# Patient Record
Sex: FEMALE | Race: WHITE | Employment: OTHER | URBAN - METROPOLITAN AREA
[De-identification: names, ages, dates, MRNs, and addresses within clinical notes are randomized per-mention and may not be internally consistent; named-entity substitution may affect disease eponyms.]

---

## 2022-09-15 ENCOUNTER — ANESTHESIA EVENT (OUTPATIENT)
Dept: ENDOSCOPY | Age: 77
End: 2022-09-15
Payer: MEDICARE

## 2022-09-16 ENCOUNTER — HOSPITAL ENCOUNTER (OUTPATIENT)
Age: 77
Setting detail: OUTPATIENT SURGERY
Discharge: HOME | End: 2022-09-16
Attending: INTERNAL MEDICINE | Admitting: INTERNAL MEDICINE
Payer: MEDICARE

## 2022-09-16 ENCOUNTER — ANESTHESIA (OUTPATIENT)
Dept: ENDOSCOPY | Age: 77
End: 2022-09-16
Payer: MEDICARE

## 2022-09-16 VITALS
RESPIRATION RATE: 20 BRPM | SYSTOLIC BLOOD PRESSURE: 136 MMHG | BODY MASS INDEX: 37.54 KG/M2 | HEART RATE: 83 BPM | OXYGEN SATURATION: 93 % | HEIGHT: 62 IN | DIASTOLIC BLOOD PRESSURE: 83 MMHG | WEIGHT: 204 LBS | TEMPERATURE: 96 F

## 2022-09-16 DIAGNOSIS — K21.9 GASTROESOPHAGEAL REFLUX DISEASE, UNSPECIFIED WHETHER ESOPHAGITIS PRESENT: ICD-10-CM

## 2022-09-16 PROCEDURE — 3700000001 HC ADD 15 MINUTES (ANESTHESIA): Performed by: INTERNAL MEDICINE

## 2022-09-16 PROCEDURE — 3609010600 HC COLONOSCOPY POLYPECTOMY SNARE/COLD BIOPSY: Performed by: INTERNAL MEDICINE

## 2022-09-16 PROCEDURE — 2580000003 HC RX 258: Performed by: FAMILY MEDICINE

## 2022-09-16 PROCEDURE — 7100000010 HC PHASE II RECOVERY - FIRST 15 MIN: Performed by: INTERNAL MEDICINE

## 2022-09-16 PROCEDURE — 7100000011 HC PHASE II RECOVERY - ADDTL 15 MIN: Performed by: INTERNAL MEDICINE

## 2022-09-16 PROCEDURE — 6360000002 HC RX W HCPCS

## 2022-09-16 PROCEDURE — 2720000010 HC SURG SUPPLY STERILE: Performed by: INTERNAL MEDICINE

## 2022-09-16 PROCEDURE — 3609013000 HC EGD TRANSORAL CONTROL BLEEDING ANY METHOD: Performed by: INTERNAL MEDICINE

## 2022-09-16 PROCEDURE — 2709999900 HC NON-CHARGEABLE SUPPLY: Performed by: INTERNAL MEDICINE

## 2022-09-16 PROCEDURE — 3609012400 HC EGD TRANSORAL BIOPSY SINGLE/MULTIPLE: Performed by: INTERNAL MEDICINE

## 2022-09-16 PROCEDURE — 3700000000 HC ANESTHESIA ATTENDED CARE: Performed by: INTERNAL MEDICINE

## 2022-09-16 PROCEDURE — 88305 TISSUE EXAM BY PATHOLOGIST: CPT

## 2022-09-16 PROCEDURE — 2500000003 HC RX 250 WO HCPCS

## 2022-09-16 RX ORDER — OLMESARTAN MEDOXOMIL 20 MG/1
20 TABLET ORAL DAILY
COMMUNITY
Start: 2022-06-15

## 2022-09-16 RX ORDER — CYCLOBENZAPRINE HCL 10 MG
TABLET ORAL
COMMUNITY
Start: 2018-01-15

## 2022-09-16 RX ORDER — HYDROCORTISONE 5 MG/1
TABLET ORAL
COMMUNITY
Start: 2019-03-21

## 2022-09-16 RX ORDER — LORATADINE 10 MG/1
TABLET ORAL
COMMUNITY
Start: 2022-01-03

## 2022-09-16 RX ORDER — NYSTATIN 100000 U/G
OINTMENT TOPICAL 3 TIMES DAILY
COMMUNITY
Start: 2022-04-29

## 2022-09-16 RX ORDER — LIDOCAINE HYDROCHLORIDE 20 MG/ML
INJECTION, SOLUTION EPIDURAL; INFILTRATION; INTRACAUDAL; PERINEURAL PRN
Status: DISCONTINUED | OUTPATIENT
Start: 2022-09-16 | End: 2022-09-16 | Stop reason: SDUPTHER

## 2022-09-16 RX ORDER — SODIUM CHLORIDE 9 MG/ML
INJECTION, SOLUTION INTRAVENOUS PRN
Status: DISCONTINUED | OUTPATIENT
Start: 2022-09-16 | End: 2022-09-16 | Stop reason: HOSPADM

## 2022-09-16 RX ORDER — SODIUM CHLORIDE 0.9 % (FLUSH) 0.9 %
5-40 SYRINGE (ML) INJECTION PRN
Status: DISCONTINUED | OUTPATIENT
Start: 2022-09-16 | End: 2022-09-16 | Stop reason: HOSPADM

## 2022-09-16 RX ORDER — LORAZEPAM 1 MG/1
1 TABLET ORAL NIGHTLY PRN
COMMUNITY
Start: 2018-04-24 | End: 2022-11-17

## 2022-09-16 RX ORDER — PROPOFOL 10 MG/ML
INJECTION, EMULSION INTRAVENOUS PRN
Status: DISCONTINUED | OUTPATIENT
Start: 2022-09-16 | End: 2022-09-16 | Stop reason: SDUPTHER

## 2022-09-16 RX ORDER — ERGOCALCIFEROL (VITAMIN D2) 1250 MCG
50000 CAPSULE ORAL
COMMUNITY

## 2022-09-16 RX ORDER — PROPOFOL 10 MG/ML
INJECTION, EMULSION INTRAVENOUS CONTINUOUS PRN
Status: DISCONTINUED | OUTPATIENT
Start: 2022-09-16 | End: 2022-09-16 | Stop reason: SDUPTHER

## 2022-09-16 RX ORDER — DULOXETIN HYDROCHLORIDE 30 MG/1
CAPSULE, DELAYED RELEASE ORAL
COMMUNITY
Start: 2022-09-05

## 2022-09-16 RX ORDER — ALBUTEROL SULFATE 90 UG/1
AEROSOL, METERED RESPIRATORY (INHALATION)
COMMUNITY
Start: 2015-11-05

## 2022-09-16 RX ORDER — DICYCLOMINE HYDROCHLORIDE 10 MG/1
CAPSULE ORAL
COMMUNITY
Start: 2022-08-24

## 2022-09-16 RX ORDER — ACETAMINOPHEN 500 MG
500 TABLET ORAL EVERY 6 HOURS PRN
COMMUNITY

## 2022-09-16 RX ORDER — DULOXETIN HYDROCHLORIDE 30 MG/1
CAPSULE, DELAYED RELEASE ORAL
COMMUNITY
Start: 2022-08-03

## 2022-09-16 RX ORDER — VALACYCLOVIR HYDROCHLORIDE 500 MG/1
500 TABLET, FILM COATED ORAL DAILY PRN
COMMUNITY
Start: 2020-04-15

## 2022-09-16 RX ORDER — AZELASTINE 1 MG/ML
1 SPRAY, METERED NASAL 2 TIMES DAILY
COMMUNITY

## 2022-09-16 RX ORDER — IRON,CARBONYL/ASCORBIC ACID 65MG-125MG
1 TABLET, DELAYED RELEASE (ENTERIC COATED) ORAL
COMMUNITY
Start: 2022-06-29

## 2022-09-16 RX ORDER — SODIUM CHLORIDE 0.9 % (FLUSH) 0.9 %
5-40 SYRINGE (ML) INJECTION EVERY 12 HOURS SCHEDULED
Status: DISCONTINUED | OUTPATIENT
Start: 2022-09-16 | End: 2022-09-16 | Stop reason: HOSPADM

## 2022-09-16 RX ORDER — SODIUM CHLORIDE, SODIUM LACTATE, POTASSIUM CHLORIDE, CALCIUM CHLORIDE 600; 310; 30; 20 MG/100ML; MG/100ML; MG/100ML; MG/100ML
INJECTION, SOLUTION INTRAVENOUS CONTINUOUS
Status: DISCONTINUED | OUTPATIENT
Start: 2022-09-16 | End: 2022-09-16 | Stop reason: HOSPADM

## 2022-09-16 RX ORDER — GABAPENTIN 400 MG/1
CAPSULE ORAL
COMMUNITY
Start: 2021-01-22

## 2022-09-16 RX ADMIN — PROPOFOL 100 MCG/KG/MIN: 10 INJECTION, EMULSION INTRAVENOUS at 11:02

## 2022-09-16 RX ADMIN — PROPOFOL 30 MG: 10 INJECTION, EMULSION INTRAVENOUS at 11:02

## 2022-09-16 RX ADMIN — LIDOCAINE HYDROCHLORIDE 100 MG: 20 INJECTION, SOLUTION EPIDURAL; INFILTRATION; INTRACAUDAL; PERINEURAL at 11:00

## 2022-09-16 RX ADMIN — SODIUM CHLORIDE, POTASSIUM CHLORIDE, SODIUM LACTATE AND CALCIUM CHLORIDE: 600; 310; 30; 20 INJECTION, SOLUTION INTRAVENOUS at 10:25

## 2022-09-16 ASSESSMENT — PAIN SCALES - GENERAL
PAINLEVEL_OUTOF10: 0
PAINLEVEL_OUTOF10: 0

## 2022-09-16 ASSESSMENT — PAIN - FUNCTIONAL ASSESSMENT: PAIN_FUNCTIONAL_ASSESSMENT: 0-10

## 2022-09-16 NOTE — DISCHARGE INSTRUCTIONS
ENDOSCOPY DISCHARGE INSTRUCTIONS:    Call the physician that did your procedure for any questions or concern:    [facility]: [phone]  DR. LUCERO ALCALA       ACTIVITY:    There are potential side effects to the medications used for sedation and anesthesia during your procedure. These include:  Dizziness or light-headedness, confusion or memory loss, delayed reaction times, loss of coordination, nausea and vomiting. Because of your increased risk for injury, we ask that you observe the following precautions: For the next 24 hours,  DO NOT operate an automobile, bicycle, motorcycle, , power tools or large equipment of any kind. Do not drink alcohol, sign any legal documents or make any legal decisions for 24 hours. Do not bend your head over lower than your heart. DO sit on the side of bed/couch awhile before getting up. Plan on bedrest or quiet relaxation today. You may resume normal activities in 24 hours. DIET:    Your first meal today should be light, avoiding spicy and fatty foods. If you tolerate this first meal, then you may advance to your regular diet unless otherwise advised by your physician. NORMAL SYMPTOMS:  -Mild sore throat if youve had an EGD   -Gaseous discomfort    NOTIFY YOUR PHYSICIAN IF THESE SYMPTOMS OCCUR:  1. Fever (greater than 100)  5. Increased abdominal bloating  2. Severe pain    6. Excessive bleeding  3. Nausea and vomiting  7. Chest pain                                                                    4. Chills    8. Shortness of breath    ADDITIONAL INSTRUCTIONS:    Biopsy results: Call [address],[facility] for biopsy results in 1 week    Educational Information:  YOU  HAD 16 POLYPS-  FOLLOW UP AND REPEAT IN 1 YEAR     Colon Polyps: Care Instructions  Your Care Instructions     Colon polyps are growths in the colon or the rectum. The cause of most colon polyps is not known, and most people who get them do not have any problems.  But a certain kind can turn into cancer. For this reason, regular testing for colon polyps is important for people as they get older. It is also important for anyone who has an increased risk for colon cancer. Polyps are usually found through routine colon cancer screening tests. Although most colon polyps are not cancerous, they are usually removed and then tested for cancer. Screening for colon cancer saves lives because the cancer can usually be cured if it is caught early. If you have a polyp that is the type that can turn into cancer, you may need more tests to examine your entire colon. The doctor will remove any other polyps that are found, and you will be tested more often. Follow-up care is a key part of your treatment and safety. Be sure to make and go to all appointments, and call your doctor if you are having problems. It's also a good idea to know your test results and keep a list of the medicines you take. How can you care for yourself at home? Regular exams to look for colon polyps are the best way to prevent polyps from turning into colon cancer. These can include stool tests, sigmoidoscopy, colonoscopy, and CT colonography. Talk with your doctor about a testing schedule that is right for you. To prevent polyps  There is no home treatment that can prevent colon polyps. But these steps may help lower your risk for cancer. Stay active. Being active can help you get to and stay at a healthy weight. Try to exercise on most days of the week. Walking is a good choice. Eat well. Choose a variety of vegetables, fruits, legumes (such as peas and beans), fish, poultry, and whole grains. Do not smoke. If you need help quitting, talk to your doctor about stop-smoking programs and medicines. These can increase your chances of quitting for good. If you drink alcohol, limit how much you drink. Limit alcohol to 2 drinks a day for men and 1 drink a day for women. When should you call for help?    Call your doctor now or seek immediate medical care if:    You have severe belly pain. Your stools are maroon or very bloody. Watch closely for changes in your health, and be sure to contact your doctor if:    You have a fever. You have nausea or vomiting. You have a change in bowel habits (new constipation or diarrhea). Your symptoms get worse or are not improving as expected. Where can you learn more? Go to [email or web address] org and sign in to your [employer] account. Enter [phone] [id number] in the [employer] box to learn more about \"Colon Polyps: Care Instructions. \"     If you do not have an account, please click on the \"Sign Up Now\" link. Current as of: June 6, 2022               Content Version: 13.4  © 7208-2418 Healthwise, Incorporated. Care instructions adapted under license by [facility] ([facility]). If you have questions about a medical condition or this instruction, always ask your healthcare professional. [address] any warranty or liability for your use of this information. Please review these discharge instructions this evening or tomorrow for  information you may have forgotten. We want to thank you for choosing the [facility] as your health care provider. We always strive to provide you with excellent care while you are here. You may receive a survey in the mail regarding your care. We would appreciate you taking a few minutes of your time to complete this survey.

## 2022-09-16 NOTE — PROGRESS NOTES
Discharge instructions reviewed with patient/responsible adult and understanding verbalized. Discharge instructions signed and copies given. Patient discharged home with belongings. Pt left per w/c to go home .  SOB w exertion, states it is the same as usual.

## 2022-09-16 NOTE — PROCEDURES
Colonoscopy Procedure  Note          Patient: Woody Delarosa  : 1945  CRN:  @[id number]@    Procedure: Colonoscopy with biopsy, polypectomy (cold snare)    Date:  2022    Surgeon:  Mare Hoffman MD, MD    Referring Physician:  None Provider    Preoperative Diagnosis:  Gastroesophageal reflux disease, unspecified whether esophagitis present [K21.9]    Postoperative Diagnosis:  * No post-op diagnosis entered *    Anesthesia:  MAC    EBL: Minimal to none. Indications: This is a 78y.o. year old female who presents came to the office recently and she been having problems with a change in bowel habits were doing a colonoscopy    Procedure: An informed consent was obtained from the patient after explanation of indications, benefits, possible risks and complications of the procedure. The patient was then taken to the endoscopy suite, placed in the left lateral decubitus position, and the above IV anesthesia was administered. Digital rectal examination was performed. No masses good rectal tone      Rectum there were multiple small polyps we took 7 with jumbo biopsy forceps    Sigmoid diverticulosis and we also removed 2 polyps here with jumbo biopsy forceps    Descending we removed 4 polyps here she also had diverticulosis to those polyps we did remove with cold snare polypectomy and 1 of those we needed to place an Endo Clip on    Transverse there were no polyps here    Ascending we removed 3 polyps here in all 3 of these with jumbo biopsy forceps    Cecum this was normal    TI not intubated    Prep was excellent      The patient tolerated the procedure well and was taken to the PACU in good condition. There were no immediate complications.       Impression: 16 total colon polyps removed  Diverticulosis    Recommendations: Await biopsy results  Repeat colonoscopy in 1 year due to the number of polyps that we removed today  Thank you for this kind referral      Mare Hoffman MD, MD [facility]  9/16/2022      Please note that some or all of this record was generated using voice recognition software. If there are any questions about the content of this document, please contact the author as some errors in translation may have occurred.

## 2022-09-16 NOTE — H&P
Gastroenterology Note             Pre-operative History and Physical    Patient: Juany Gtz  : 1945  CSN:     History Obtained From:  patient and/or guardian. HISTORY OF PRESENT ILLNESS:    The patient is a 78 y.o. female  here for EGD/colonoscopy. This is a patient who I had the pleasure of seeing in the office she has been having problems with the loss of appetite and change in her bowel habits are doing upper and lower endoscopy    Past Medical History:    Past Medical History:   Diagnosis Date    Adrenal insufficiency (HCC)     CKD (chronic kidney disease)     COPD (chronic obstructive pulmonary disease) (HCC)     Depression     GERD (gastroesophageal reflux disease)     Hyperlipidemia     Hypertension     Sleep apnea     CPAP at night     Past Surgical History:    Past Surgical History:   Procedure Laterality Date    BACK SURGERY      CARPAL TUNNEL RELEASE Bilateral     Twice    JOINT REPLACEMENT Right     Hip    JOINT REPLACEMENT Left     Knee    KNEE ARTHROSCOPY Right     LAMINECTOMY      SPINAL FUSION       Medications Prior to Admission:   No current facility-administered medications on file prior to encounter.      Current Outpatient Medications on File Prior to Encounter   Medication Sig Dispense Refill    albuterol sulfate HFA (PROVENTIL;VENTOLIN;PROAIR) 108 (90 Base) MCG/ACT inhaler INHALE TWO PUFFS BY MOUTH into THE lungs EVERY 6 HOURS AS NEEDED wheezing      cyclobenzaprine (FLEXERIL) 10 MG tablet TAKE 1 OR 2 TABLETS BY MOUTH AT BEDTIME AS NEEDED muscle SPASMS      diclofenac sodium (VOLTAREN) 1 % GEL Apply 4 g topically 4 times daily      DULoxetine (CYMBALTA) 30 MG extended release capsule TAKE ONE CAPSULE BY MOUTH EVERY EVENING      fluticasone-umeclidin-vilant (TRELEGY ELLIPTA) 100-62.5-25 MCG/INH AEPB Inhale 1 puff into the lungs daily      gabapentin (NEURONTIN) 400 MG capsule TAKE 2 CAPSULES EVERY AFTERNOON AND 2 CAPSULES EVERY EVENING      hydrocortisone (CORTEF) 5 MG tablet Take 3 tablets by mouth every morning and take 1 tablet every afternoon. Take triple dose when sick and inform doctor of illness. hydrocortisone sodium succinate PF (SOLU-CORTEF) 100 MG injection jennifer 100mg intramuscular inj in emergency when oral hydrocortisone cannot be taken, inform MD      ipratropium (ATROVENT) 0.02 % nebulizer solution USE 1 VIAL VIA NEBULIZER EVERY 6 HOURS AS NEEDED FOR SHORTNESS OF BREATH. Iron-Vitamin C (VITRON-C)  MG TABS Take 1 tablet by mouth Every Monday, Wednesday, and Friday      loratadine (CLARITIN) 10 MG tablet TAKE ONE TABLET BY MOUTH every night AT BEDTIME      LORazepam (ATIVAN) 1 MG tablet Take 1 mg by mouth nightly as needed.       nystatin (MYCOSTATIN) 923514 UNIT/GM ointment Apply topically 3 times daily      olmesartan (BENICAR) 20 MG tablet Take 20 mg by mouth daily      valACYclovir (VALTREX) 500 MG tablet Take 500 mg by mouth daily as needed (Patient not taking: Reported on 2022)      acetaminophen (TYLENOL) 500 MG tablet Take 500 mg by mouth every 6 hours as needed      azelastine (ASTELIN) 0.1 % nasal spray 1 spray by Nasal route 2 times daily      DULoxetine (CYMBALTA) 30 MG extended release capsule TAKE ONE CAPSULE BY MOUTH EVERY EVENING      ergocalciferol (ERGOCALCIFEROL) 1.25 MG (67999 UT) capsule Take 50,000 Units by mouth      fluticasone (VERAMYST) 27.5 MCG/SPRAY nasal spray 2 sprays by Nasal route daily      dicyclomine (BENTYL) 10 MG capsule TAKE ONE CAPSULE BY MOUTH EVERY DAY          Allergies:  Codeine, Hydrocodone-acetaminophen, Oxycodone-acetaminophen, Penicillins, Tramadol, Morphine, Fentanyl, and Sulfa antibiotics      Social History:   Social History     Tobacco Use    Smoking status: Former     Packs/day: 0.25     Years: 40.00     Pack years: 10.00     Types: Cigarettes     Quit date: 2021     Years since quittin.4    Smokeless tobacco: Never   Substance Use Topics    Alcohol use: Yes     Comment: occasionaly Family History:   History reviewed. No pertinent family history. PHYSICAL EXAM:      BP (!) 152/78   Pulse 99   Temp 97.6 °F (36.4 °C) (Temporal)   Resp 18   Ht 5' 2\" (1.575 m)   Wt 204 lb (92.5 kg)   SpO2 96%   BMI 37.31 kg/m²  I        Heart:   RRR, normal s1s2    Lungs:  CTA bilat,  Normal effort    Abdomen:   NT, ND      ASA Grade:  ASA 3 - Patient with moderate systemic disease with functional limitations    Mallampati Class: 3          ASSESSMENT AND PLAN:    1. Patient is a 78 y.o. female here for EGD/Colonoscopy with MAC.   2.  Procedure options, risks and benefits reviewed with patient. Patient expresses understanding.     Adrián Sierra MD,   [address]  9/16/2022

## 2022-09-16 NOTE — ANESTHESIA PRE PROCEDURE
Department of Anesthesiology  Preprocedure Note       Name:  Ailyn Alexandra   Age:  78 y.o.  :  1945                                          MRN:  [id number]         Date:  2022      Surgeon: Jimmie Gupta):  Vilma Cao MD    Procedure: Procedure(s):  ESOPHAGOGASTRODUODENOSCOPY  COLONOSCOPY    Medications prior to admission:   Prior to Admission medications    Medication Sig Start Date End Date Taking? Authorizing Provider   albuterol sulfate HFA (PROVENTIL;VENTOLIN;PROAIR) 108 (90 Base) MCG/ACT inhaler INHALE TWO PUFFS BY MOUTH into THE lungs EVERY 6 HOURS AS NEEDED wheezing 11/5/15  Yes Historical Provider, MD   cyclobenzaprine (FLEXERIL) 10 MG tablet TAKE 1 OR 2 TABLETS BY MOUTH AT BEDTIME AS NEEDED muscle SPASMS 1/15/18  Yes Historical Provider, MD   diclofenac sodium (VOLTAREN) 1 % GEL Apply 4 g topically 4 times daily 17  Yes Historical Provider, MD   DULoxetine (CYMBALTA) 30 MG extended release capsule TAKE ONE CAPSULE BY MOUTH EVERY EVENING 8/3/22  Yes Historical Provider, MD   fluticasone-umeclidin-vilant (TRELEGY ELLIPTA) 100-62.5-25 MCG/INH AEPB Inhale 1 puff into the lungs daily 21  Yes Historical Provider, MD   gabapentin (NEURONTIN) 400 MG capsule TAKE 2 CAPSULES EVERY AFTERNOON AND 2 CAPSULES EVERY EVENING 21  Yes Historical Provider, MD   hydrocortisone (CORTEF) 5 MG tablet Take 3 tablets by mouth every morning and take 1 tablet every afternoon. Take triple dose when sick and inform doctor of illness.  3/21/19  Yes Historical Provider, MD   hydrocortisone sodium succinate PF (SOLU-CORTEF) 100 MG injection jennifer 100mg intramuscular inj in emergency when oral hydrocortisone cannot be taken, inform MD 20  Yes Historical Provider, MD   ipratropium (ATROVENT) 0.02 % nebulizer solution USE 1 VIAL VIA NEBULIZER EVERY 6 HOURS AS NEEDED FOR SHORTNESS OF BREATH. 20  Yes Historical Provider, MD   Iron-Vitamin C (VITRON-C)  MG TABS Take 1 tablet by mouth Every Monday, Wednesday, and Friday 6/29/22  Yes Historical Provider, MD   loratadine (CLARITIN) 10 MG tablet TAKE ONE TABLET BY MOUTH every night AT BEDTIME 1/3/22  Yes Historical Provider, MD   LORazepam (ATIVAN) 1 MG tablet Take 1 mg by mouth nightly as needed. 4/24/18 11/17/22 Yes Historical Provider, MD   nystatin (MYCOSTATIN) 918477 UNIT/GM ointment Apply topically 3 times daily 4/29/22  Yes Historical Provider, MD   olmesartan (BENICAR) 20 MG tablet Take 20 mg by mouth daily 6/15/22  Yes Historical Provider, MD   valACYclovir (VALTREX) 500 MG tablet Take 500 mg by mouth daily as needed  Patient not taking: Reported on 9/16/2022 4/15/20  Yes Historical Provider, MD   acetaminophen (TYLENOL) 500 MG tablet Take 500 mg by mouth every 6 hours as needed    Historical Provider, MD   azelastine (ASTELIN) 0.1 % nasal spray 1 spray by Nasal route 2 times daily    Historical Provider, MD   DULoxetine (CYMBALTA) 30 MG extended release capsule TAKE ONE CAPSULE BY MOUTH EVERY EVENING 9/5/22   Historical Provider, MD   ergocalciferol (ERGOCALCIFEROL) 1.25 MG (80740 UT) capsule Take 50,000 Units by mouth    Historical Provider, MD   fluticasone (VERAMYST) 27.5 MCG/SPRAY nasal spray 2 sprays by Nasal route daily    Historical Provider, MD   dicyclomine (BENTYL) 10 MG capsule TAKE ONE CAPSULE BY MOUTH EVERY DAY 8/24/22   Historical Provider, MD       Current medications:    No current facility-administered medications for this encounter. Allergies: Allergies   Allergen Reactions    Codeine Anaphylaxis    Hydrocodone-Acetaminophen Nausea And Vomiting    Oxycodone-Acetaminophen Itching and Shortness Of Breath    Penicillins Anaphylaxis    Tramadol Itching and Shortness Of Breath    Morphine Nausea And Vomiting    Fentanyl Nausea And Vomiting    Sulfa Antibiotics Nausea Only       Problem List:  There is no problem list on file for this patient. Past Medical History:  No past medical history on file.     Past Surgical History:  No past surgical history on file. Social History:    Social History     Tobacco Use    Smoking status: Not on file    Smokeless tobacco: Not on file   Substance Use Topics    Alcohol use: Not on file                                Counseling given: Not Answered      Vital Signs (Current):   Vitals:    09/16/22 0919   BP: (!) 152/78   Pulse: 99   Resp: 18   Temp: 97.6 °F (36.4 °C)   TempSrc: Temporal   SpO2: 95%   Weight: 204 lb (92.5 kg)   Height: 5' 2\" (1.575 m)                                              BP Readings from Last 3 Encounters:   09/16/22 (!) 152/78       NPO Status:                                                                                 BMI:   Wt Readings from Last 3 Encounters:   09/16/22 204 lb (92.5 kg)     Body mass index is 37.31 kg/m². CBC: No results found for: WBC, RBC, HGB, HCT, MCV, RDW, PLT    CMP: No results found for: NA, K, CL, CO2, BUN, CREATININE, GFRAA, AGRATIO, LABGLOM, GLUCOSE, GLU, PROT, CALCIUM, BILITOT, ALKPHOS, AST, ALT    POC Tests: No results for input(s): POCGLU, POCNA, POCK, POCCL, POCBUN, POCHEMO, POCHCT in the last 72 hours.     Coags: No results found for: PROTIME, INR, APTT    HCG (If Applicable): No results found for: PREGTESTUR, PREGSERUM, HCG, HCGQUANT     ABGs: No results found for: PHART, PO2ART, VMZ9DGE, XOA1VLH, BEART, J5SULPXG     Type & Screen (If Applicable):  No results found for: LABABO, LABRH    Drug/Infectious Status (If Applicable):  No results found for: HIV, HEPCAB    COVID-19 Screening (If Applicable): No results found for: COVID19        Anesthesia Evaluation  Patient summary reviewed and Nursing notes reviewed no history of anesthetic complications:   Airway: Mallampati: III  TM distance: >3 FB   Neck ROM: full  Mouth opening: > = 3 FB   Dental: normal exam         Pulmonary:Negative Pulmonary ROS and normal exam                               Cardiovascular:  Exercise tolerance: good (>4 METS),       (-)  angina, orthopnea and PND    ECG reviewed  Rhythm: regular  Rate: normal           Beta Blocker:  Not on Beta Blocker         Neuro/Psych:   Negative Neuro/Psych ROS              GI/Hepatic/Renal: Neg GI/Hepatic/Renal ROS            Endo/Other: Negative Endo/Other ROS                    Abdominal:   (+) obese,     Abdomen: soft. Vascular: negative vascular ROS. Other Findings:           Anesthesia Plan      MAC     ASA 3       Induction: intravenous. Anesthetic plan and risks discussed with patient. Plan discussed with CRNA and attending.                     Lizeth Adams DO   9/16/2022

## 2022-09-16 NOTE — PROCEDURES
Endoscopy Note    Patient: Skip Ochoa  : 1945  CSN:     Procedure: Esophagogastroduodenoscopy with bx     Date:  2022     Surgeon:  Carla Hahn MD     Referring Physician:  none     Preoperative Diagnosis:  loss of appetite     Postoperative Diagnosis:  hiatal hernia   Gastritis     Anesthesia: Monitored anesthesia care    EBL: <5 mL    Indications: This is a 78y.o. year old female who recently came to the office because she has been having problems with loss of appetite redoing an upper endoscopy    Description of Procedure:  Informed consent was obtained from the patient after explanation of indications, benefits and possible risks and complications of the procedure. The patient was then taken to the endoscopy suite, placed in the left lateral decubitus position and the above IV sedation was administrered. The Olympus videoendoscope was passed through the hypopharynx   Posterior pharynx was normal    Esophagus was normal    Hiatal hernia 2 cm    Stomach had some inflammation we did biopsy for H. Pylori    Duodenum was normal    Retroflexion showed the hiatal hernia no other abnormalities      Gastric or Duodenal ulcer present: No      The patient tolerated the procedure well and was taken to the post anesthesia care unit in good condition. Impression: #1 hiatal hernia #2 gastritis      Recommendations:  At this time from the upper GI system it is hard to know why she is having a loss of appetite  Need to make sure she is on a proton pump inhibitor  We will also start checking into some imaging like a right upper quadrant ultrasound  Thank you for this kind referral    Carla Hahn MD, MD  [address] [facility]  [phone]

## 2022-09-20 NOTE — RESULT ENCOUNTER NOTE
Called and spoke with patient   Repeat colon in 1 year     I told her that she did not have bad reflux   And she needs to be on ppi daily

## 2024-04-12 RX ORDER — OMEPRAZOLE 40 MG/1
40 CAPSULE, DELAYED RELEASE ORAL DAILY
COMMUNITY
Start: 2023-12-14

## 2024-04-12 RX ORDER — MECLIZINE HCL 12.5 MG/1
12.5 TABLET ORAL AS NEEDED
COMMUNITY
Start: 2019-05-14

## 2024-04-12 RX ORDER — BUPROPION HYDROCHLORIDE 150 MG/1
150 TABLET ORAL DAILY
COMMUNITY
Start: 2019-02-11

## 2024-04-12 RX ORDER — DULOXETIN HYDROCHLORIDE 60 MG/1
60 CAPSULE, DELAYED RELEASE ORAL NIGHTLY
COMMUNITY
Start: 2018-07-12

## 2024-04-12 NOTE — PROGRESS NOTES
[facility] PRE-OPERATIVE INSTRUCTIONS       DOS: __4/17/24__        Pre-Op Instructions     [x]  A History and Physical will be required within 30 days prior to surgery date. Some patients may require cardiac or pulmonary clearance. H&P will be completed DOS for Endo/colonoscopy patients.     [x]  Reviewed Medical and Surgical history, medication list, confirmed with patient any implants, allergies, bleeding disorders, ALLY and reactions to Anesthesia.    [x]  If there is a change in physical condition between now and the day of surgery, please notify your surgeon. This includes a cough, cold, fever, sore throat, nausea, vomiting and diarrhea. Also notify your surgeon if you experience dizziness, shortness of breath or blurred vision.    [x] Reviewed hx of C-Diff, MRSA, VRE and/or recent use of Antibiotics     [x]  All patients having a procedure must have a ride home by a responsible person that is over the age of 18 and ensure it is someone that we can share medical information with. After discharge, a responsible adult needs to stay with you for 24 hours. There is a limit of 2 adult visitors per room.     If unable to secure ride and/or care taker, please contact surgeon's office.      [x]  No alcohol, smoking or marijuana use 24 hours prior to surgery. Any use of recreational drugs must be stopped 5 days prior to surgery.     [x]  NPO after midnight (Any heart, BP, seizure, thyroid and breathing medications are okay to take the morning of surgery with a small sip of water).    The morning of surgery, you may brush your teeth, just no swallowing water. Also, NO gum, candy, mints or ice chips.    [x]  For Colonoscopy's, follow prep-instructions as indicated by physician.     []  Patients with a insulin pump, keep set on basal rate on day of surgery. Long-acting insulin should be administered the evening before, take only half of usual dose. Always check with prescribing doctor if there are any

## 2024-04-16 ENCOUNTER — ANESTHESIA EVENT (OUTPATIENT)
Age: 79
End: 2024-04-16
Payer: MEDICARE

## 2024-04-17 ENCOUNTER — HOSPITAL ENCOUNTER (OUTPATIENT)
Age: 79
Setting detail: OUTPATIENT SURGERY
Discharge: HOME | End: 2024-04-17
Attending: INTERNAL MEDICINE | Admitting: INTERNAL MEDICINE
Payer: MEDICARE

## 2024-04-17 ENCOUNTER — ANESTHESIA (OUTPATIENT)
Age: 79
End: 2024-04-17
Payer: MEDICARE

## 2024-04-17 VITALS
HEIGHT: 62 IN | BODY MASS INDEX: 35.7 KG/M2 | SYSTOLIC BLOOD PRESSURE: 127 MMHG | TEMPERATURE: 97.6 F | OXYGEN SATURATION: 97 % | WEIGHT: 194 LBS | HEART RATE: 93 BPM | RESPIRATION RATE: 22 BRPM | DIASTOLIC BLOOD PRESSURE: 81 MMHG

## 2024-04-17 DIAGNOSIS — Z86.010 HISTORY OF COLON POLYPS: ICD-10-CM

## 2024-04-17 DIAGNOSIS — R13.14 DYSPHAGIA, PHARYNGOESOPHAGEAL PHASE: ICD-10-CM

## 2024-04-17 PROCEDURE — 6360000002 HC RX W HCPCS: Performed by: NURSE ANESTHETIST, CERTIFIED REGISTERED

## 2024-04-17 PROCEDURE — 7100000011 HC PHASE II RECOVERY - ADDTL 15 MIN: Performed by: INTERNAL MEDICINE

## 2024-04-17 PROCEDURE — 2709999900 HC NON-CHARGEABLE SUPPLY: Performed by: INTERNAL MEDICINE

## 2024-04-17 PROCEDURE — 3609010300 HC COLONOSCOPY W/BIOPSY SINGLE/MULTIPLE: Performed by: INTERNAL MEDICINE

## 2024-04-17 PROCEDURE — 2580000003 HC RX 258: Performed by: ANESTHESIOLOGY

## 2024-04-17 PROCEDURE — 3609012400 HC EGD TRANSORAL BIOPSY SINGLE/MULTIPLE: Performed by: INTERNAL MEDICINE

## 2024-04-17 PROCEDURE — 3700000001 HC ADD 15 MINUTES (ANESTHESIA): Performed by: INTERNAL MEDICINE

## 2024-04-17 PROCEDURE — 3700000000 HC ANESTHESIA ATTENDED CARE: Performed by: INTERNAL MEDICINE

## 2024-04-17 PROCEDURE — 3609017700 HC EGD DILATION GASTRIC/DUODENAL STRICTURE: Performed by: INTERNAL MEDICINE

## 2024-04-17 PROCEDURE — 88305 TISSUE EXAM BY PATHOLOGIST: CPT

## 2024-04-17 PROCEDURE — 94761 N-INVAS EAR/PLS OXIMETRY MLT: CPT

## 2024-04-17 PROCEDURE — 2580000003 HC RX 258: Performed by: NURSE ANESTHETIST, CERTIFIED REGISTERED

## 2024-04-17 PROCEDURE — 7100000010 HC PHASE II RECOVERY - FIRST 15 MIN: Performed by: INTERNAL MEDICINE

## 2024-04-17 PROCEDURE — 2700000000 HC OXYGEN THERAPY PER DAY

## 2024-04-17 RX ORDER — SODIUM CHLORIDE 9 MG/ML
INJECTION, SOLUTION INTRAVENOUS PRN
Status: DISCONTINUED | OUTPATIENT
Start: 2024-04-17 | End: 2024-04-17 | Stop reason: HOSPADM

## 2024-04-17 RX ORDER — SODIUM CHLORIDE 0.9 % (FLUSH) 0.9 %
5-40 SYRINGE (ML) INJECTION PRN
Status: DISCONTINUED | OUTPATIENT
Start: 2024-04-17 | End: 2024-04-17 | Stop reason: HOSPADM

## 2024-04-17 RX ORDER — SODIUM CHLORIDE 9 MG/ML
INJECTION, SOLUTION INTRAVENOUS CONTINUOUS PRN
Status: DISCONTINUED | OUTPATIENT
Start: 2024-04-17 | End: 2024-04-17 | Stop reason: SDUPTHER

## 2024-04-17 RX ORDER — SODIUM CHLORIDE 0.9 % (FLUSH) 0.9 %
5-40 SYRINGE (ML) INJECTION EVERY 12 HOURS SCHEDULED
Status: DISCONTINUED | OUTPATIENT
Start: 2024-04-17 | End: 2024-04-17 | Stop reason: HOSPADM

## 2024-04-17 RX ORDER — PROPOFOL 10 MG/ML
INJECTION, EMULSION INTRAVENOUS PRN
Status: DISCONTINUED | OUTPATIENT
Start: 2024-04-17 | End: 2024-04-17 | Stop reason: SDUPTHER

## 2024-04-17 RX ORDER — LIDOCAINE HYDROCHLORIDE 20 MG/ML
INJECTION, SOLUTION INTRAVENOUS PRN
Status: DISCONTINUED | OUTPATIENT
Start: 2024-04-17 | End: 2024-04-17 | Stop reason: SDUPTHER

## 2024-04-17 RX ORDER — DROPERIDOL 2.5 MG/ML
0.62 INJECTION, SOLUTION INTRAMUSCULAR; INTRAVENOUS
Status: DISCONTINUED | OUTPATIENT
Start: 2024-04-17 | End: 2024-04-17 | Stop reason: HOSPADM

## 2024-04-17 RX ORDER — ONDANSETRON 2 MG/ML
4 INJECTION INTRAMUSCULAR; INTRAVENOUS
Status: DISCONTINUED | OUTPATIENT
Start: 2024-04-17 | End: 2024-04-17 | Stop reason: HOSPADM

## 2024-04-17 RX ORDER — GLYCOPYRROLATE 0.2 MG/ML
INJECTION INTRAMUSCULAR; INTRAVENOUS PRN
Status: DISCONTINUED | OUTPATIENT
Start: 2024-04-17 | End: 2024-04-17 | Stop reason: SDUPTHER

## 2024-04-17 RX ORDER — NALOXONE HYDROCHLORIDE 0.4 MG/ML
INJECTION, SOLUTION INTRAMUSCULAR; INTRAVENOUS; SUBCUTANEOUS PRN
Status: DISCONTINUED | OUTPATIENT
Start: 2024-04-17 | End: 2024-04-17 | Stop reason: HOSPADM

## 2024-04-17 RX ADMIN — SODIUM CHLORIDE: 9 INJECTION, SOLUTION INTRAVENOUS at 10:12

## 2024-04-17 RX ADMIN — PROPOFOL 70 MG: 10 INJECTION, EMULSION INTRAVENOUS at 11:28

## 2024-04-17 RX ADMIN — GLYCOPYRROLATE 0.2 MG: 0.2 INJECTION, SOLUTION INTRAMUSCULAR; INTRAVENOUS at 12:10

## 2024-04-17 RX ADMIN — PROPOFOL 130 MCG/KG/MIN: 10 INJECTION, EMULSION INTRAVENOUS at 11:41

## 2024-04-17 RX ADMIN — LIDOCAINE HYDROCHLORIDE 60 MG: 20 INJECTION, SOLUTION INTRAVENOUS at 11:28

## 2024-04-17 RX ADMIN — SODIUM CHLORIDE: 9 INJECTION, SOLUTION INTRAVENOUS at 11:21

## 2024-04-17 RX ADMIN — PROPOFOL 50 MG: 10 INJECTION, EMULSION INTRAVENOUS at 11:32

## 2024-04-17 RX ADMIN — PROPOFOL 50 MG: 10 INJECTION, EMULSION INTRAVENOUS at 11:38

## 2024-04-17 ASSESSMENT — COPD QUESTIONNAIRES: CAT_SEVERITY: SEVERE

## 2024-04-17 ASSESSMENT — ENCOUNTER SYMPTOMS: SHORTNESS OF BREATH: 0

## 2024-04-17 NOTE — ANESTHESIA PRE PROCEDURE
Findings:             Anesthesia Plan      MAC     ASA 3     (Hx of tracheomalacia, severe COPD, ALLY on CPAP presents for colonoscopy and EGD.)  Induction: intravenous.      Anesthetic plan and risks discussed with patient.      Plan discussed with CRNA.                    Levi Faust MD   4/17/2024

## 2024-04-17 NOTE — NURSE NAVIGATOR
Pt arrived to preop room # 5 from endo. Report received from Endo RN and CRNA. Pt resting quietly in bed, respirations even and unlabored. Attached to Preop monitoring system. Alarms and parameters set. Will continue to monitor pt closely. Call light in reach. Son in law at bedside.

## 2024-04-17 NOTE — DISCHARGE INSTRUCTIONS
ENDOSCOPY DISCHARGE INSTRUCTIONS:    Call the physician that did your procedure for any questions or concern:    [facility]: [phone]  DR. LUCERO ALCALA       ACTIVITY:    There are potential side effects to the medications used for sedation and anesthesia during your procedure.  These include:  Dizziness or light-headedness, confusion or memory loss, delayed reaction times, loss of coordination, nausea and vomiting.  Because of your increased risk for injury, we ask that you observe the following precautions:  For the next 24 hours,  DO NOT operate an automobile, bicycle, motorcycle, , power tools or large equipment of any kind.  Do not drink alcohol, sign any legal documents or make any legal decisions for 24 hours.  Do not bend your head over lower than your heart.  DO sit on the side of bed/couch awhile before getting up.  Plan on bedrest or quiet relaxation today.  You may resume normal activities in 24 hours.    DIET:    Your first meal today should be light, avoiding spicy and fatty foods.  If you tolerate this first meal, then you may advance to your regular diet unless otherwise advised by your physician.    NORMAL SYMPTOMS:  -Mild sore throat if you’ve had an EGD   -Gaseous discomfort    NOTIFY YOUR PHYSICIAN IF THESE SYMPTOMS OCCUR:  1. Fever (greater than 100)  5. Increased abdominal bloating  2. Severe pain    6. Excessive bleeding  3. Nausea and vomiting  7. Chest pain                                                                    4. Chills    8. Shortness of breath    ADDITIONAL INSTRUCTIONS:    Biopsy results: Call [facility] for biopsy results in 1 week    Please review these discharge instructions this evening or tomorrow for  information you may have forgotten.          We want to thank you for choosing the [facility] as your health care provider. We always strive to provide you with excellent care while you are here. You may receive a survey in the mail  regarding your care. We would appreciate you taking a few minutes of your time to complete this survey.       Your I.V. Site: Care Instructions  Overview     Medicines or fluids may be given through an intravenous (I.V.) tube inserted into a vein. The I.V. is most often placed in the back of the hand, on the forearm, or on the inside of the elbow.  When the I.V. is in place, medicines or fluids can go quickly into the bloodstream and into the rest of the body. The I.V. can also be used to take blood for testing.  If you had an I.V. while you were in the hospital, the area where it went into your body may be tender for a while.  Follow-up care is a key part of your treatment and safety. Be sure to make and go to all appointments, and call your doctor if you are having problems. It's also a good idea to know your test results and keep a list of the medicines you take.  How can you care for yourself at home?  Check the area for bruising or swelling for a few days after you get home.  If you have bruising or swelling, put ice or a cold pack on the area for 10 to 20 minutes at a time. Put a thin cloth between the ice and your skin.  Shower or bathe as usual.  Be gentle using the area around the I.V. site for a day or two. But you should be able to do your normal activities.  When should you call for help?   Call your doctor now or seek immediate medical care if:    You have signs of infection, such as:  Increased pain, swelling, warmth, or redness or any change in color.  Reddish streaks leading from the area.  Pus draining from the area.  A fever.   Watch closely for changes in your health, and be sure to contact your doctor if:    You notice a lump at the I.V. site.     You notice new or worse bruising at the I.V. site.   Current as of: September 25, 2023               Content Version: 14.0  © 2006-2024 [employer].   Care instructions adapted under license by [employer]. If you have questions about a

## 2024-04-17 NOTE — ANESTHESIA POSTPROCEDURE EVALUATION
Department of Anesthesiology  Postprocedure Note    Patient: Tiffani Sher  MRN: [id number]  YOB: 1945  Date of evaluation: 4/17/2024    Procedure Summary       Date: 04/17/24 Room / Location: [place] [address] / [facility]    Anesthesia Start: 1122 Anesthesia Stop: 1212    Procedures:       COLONOSCOPY BIOPSY      ESOPHAGOGASTRODUODENOSCOPY BIOPSY      ESOPHAGOGASTRODUODENOSCOPY DILATATION Diagnosis:       History of colon polyps      Dysphagia, pharyngoesophageal phase      (History of colon polyps [Z86.010])      (Dysphagia, pharyngoesophageal phase [R13.14])    Surgeons: Nestor Fernandes MD Responsible Provider: Levi Faust MD    Anesthesia Type: MAC ASA Status: 3            Anesthesia Type: No value filed.    Michel Phase I: Michel Score: 10    Michel Phase II: Michel Score: 8    Anesthesia Post Evaluation    Patient location during evaluation: PACU  Patient participation: complete - patient participated  Level of consciousness: awake  Airway patency: patent  Nausea & Vomiting: no nausea and no vomiting  Cardiovascular status: blood pressure returned to baseline  Respiratory status: acceptable  Hydration status: stable  Comments: Vital signs stable  OK to discharge from Stage I post anesthesia care.  Care transferred from Anesthesiology department on discharge from perioperative area   Multimodal analgesia pain management approach  Pain management: satisfactory to patient    No notable events documented.

## 2024-04-17 NOTE — H&P
Gastroenterology Note             Pre-operative History and Physical    Patient: Tiffani Sher  : 1945  CSN:     History Obtained From:  patient and/or guardian.     HISTORY OF PRESENT ILLNESS:    The patient is a 79 y.o. female  here for EGD/colonoscopy.  This very pleasant 79-year-old female recently came to the office she had a history of colon polyps both an upper and lower and endoscopy    Past Medical History:    Past Medical History:   Diagnosis Date    Adrenal insufficiency (HCC)     CKD (chronic kidney disease)     COPD (chronic obstructive pulmonary disease) (HCC)     Depression     GERD (gastroesophageal reflux disease)     Hyperlipidemia     Hypertension     Sleep apnea     CPAP at night     Past Surgical History:    Past Surgical History:   Procedure Laterality Date    BACK SURGERY      BLADDER TUMOR EXCISION      CARPAL TUNNEL RELEASE Bilateral     Twice    COLONOSCOPY N/A 2022    COLONOSCOPY POLYPECTOMY SNARE/COLD BIOPSY performed by Nestor Fernandes MD at [facility] ENDOSCOPY    JOINT REPLACEMENT Right     Hip    JOINT REPLACEMENT Left     Knee    KNEE ARTHROSCOPY Right     LAMINECTOMY      SPINAL FUSION      UPPER GASTROINTESTINAL ENDOSCOPY N/A 2022    EGD BIOPSY performed by Nestor Fernandes MD at [facility] ENDOSCOPY    UPPER GASTROINTESTINAL ENDOSCOPY N/A 2022    EGD CONTROL HEMORRHAGE performed by Nestor Fernandes MD at [facility] ENDOSCOPY     Medications Prior to Admission:   No current facility-administered medications on file prior to encounter.     Current Outpatient Medications on File Prior to Encounter   Medication Sig Dispense Refill    buPROPion (WELLBUTRIN XL) 150 MG extended release tablet Take 1 tablet by mouth daily      meclizine (ANTIVERT) 12.5 MG tablet Take 1 tablet by mouth as needed      omeprazole (PRILOSEC) 40 MG delayed release capsule Take 1 capsule by mouth daily      Cyanocobalamin 1000 MCG/ML KIT Inject 1,000 mcg into the muscle every 30 days      DULoxetine  Tramadol, Morphine, Fentanyl, Other, and Sulfa antibiotics      Social History:   Social History     Tobacco Use    Smoking status: Former     Current packs/day: 0.00     Average packs/day: 0.3 packs/day for 40.0 years (10.0 ttl pk-yrs)     Types: Cigarettes     Start date: 4/1/1981     Quit date: 4/1/2021     Years since quitting: 3.0    Smokeless tobacco: Never   Substance Use Topics    Alcohol use: Yes     Comment: occasionaly     Family History:   History reviewed. No pertinent family history.    PHYSICAL EXAM:      /81   Pulse 96   Temp 98.1 °F (36.7 °C) (Infrared)   Resp 16   Ht 1.575 m (5' 2\")   Wt 88 kg (194 lb)   SpO2 97%   BMI 35.48 kg/m²  I        Heart:   RRR, normal s1s2    Lungs:  CTA bilat,  Normal effort    Abdomen:   NT, ND      ASA Grade:  ASA 3 - Patient with moderate systemic disease with functional limitations    Mallampati Class: 1          ASSESSMENT AND PLAN:    1.  Patient is a 79 y.o. female here for EGD/Colonoscopy with MAC.   2.  Procedure options, risks and benefits reviewed with patient.  Patient expresses understanding.    Nestor Fernandes MD,   [facility]  4/17/2024

## 2024-04-17 NOTE — PROGRESS NOTES
Pre-Operative:  1.  Patient/Caregiver identifies - states name and date of birth.  2.  The patient is free from signs and symptoms of injury.  3.  The patient receives appropriate medication(s), safely administered during the Perioperative period.  4.  The patient is free from signs and symptoms of infection.  5.  The patient has wound / tissue perfusion.  6.  The patients's fluid, electrolyte, and acid-base balances are established preoperatively.  7.  The patient's pulmonary function is established preoperatively.  8.  The patient's cardiovascular status is established preoperatively.  9.  The patient / caregiver demonstrates knowledge of nutritional management related to the operative or other invasive procedure.  10.  The patient/caregiver demonstrates knowledge of medication management.  11.  The patient/caregiver demonstrates knowledge of pain management.  12.  The patient participates in the rehabilitation process as applicable.  13.  The patient/caregiver participates in decisions affection his or her Perioperative plan of care.  14.  The patient's care is consistent with the individualized Perioperative plan of care.  15.  The patient's right to privacy is maintained.  16.  The patient is the recipient of competent and ethical care within legal standards of practice.  17.  The patient's value system, lifestyle, ethnicity, and culture are considered, respected, and incorporated in the Perioperative plan of care and understands special services available.  18.  The patient demonstrates and/or reports adequate pain control throughout the the Perioperative period.  19.  The patient's neurological status is established preoperatively.  20.  The patient/caregiver demonstrates knowledge of the expected responses to the operative or invasive procedure.  21.  Patient/Caregiver has reduced anxiety.  Interventions- Familiarize with environment and equipment.  22. Patient/Caregiver verbalizes understanding of Phase I  and Phase II process.  23.  Patient pain level is established preoperatively using age appropriate pain scale.  24.  The patient will move to fall risk upon sedation- during and through the recovery phase.  Interventions- orient the patient to the environment, especially the location of the bathroom; provide treaded socks/non-skid footwear; demonstrate and teach back use of the nurse's call system; instruct the patient to call for help before getting out of bed; lock all movable equipment before transferring patient; keep bed in lowest position possible. 25.  Other:

## 2024-04-19 LAB — SURGICAL PATHOLOGY REPORT: NORMAL

## 2025-04-18 RX ORDER — LOSARTAN POTASSIUM 25 MG/1
25 TABLET ORAL DAILY
COMMUNITY

## 2025-04-18 RX ORDER — CETIRIZINE HYDROCHLORIDE 10 MG/1
10 TABLET ORAL DAILY
COMMUNITY

## 2025-04-18 RX ORDER — ROSUVASTATIN CALCIUM 20 MG/1
20 TABLET, COATED ORAL NIGHTLY
COMMUNITY
Start: 2025-02-27

## 2025-04-18 RX ORDER — ASPIRIN 81 MG/1
81 TABLET ORAL DAILY
COMMUNITY
Start: 2025-02-27

## 2025-04-18 RX ORDER — NITROGLYCERIN 0.4 MG/1
TABLET SUBLINGUAL
COMMUNITY
Start: 2025-02-07

## 2025-04-18 RX ORDER — SOLIFENACIN SUCCINATE 5 MG/1
5 TABLET, FILM COATED ORAL DAILY
COMMUNITY
Start: 2025-03-17

## 2025-04-18 RX ORDER — POLYETHYLENE GLYCOL 3350 17 G/17G
17 POWDER, FOR SOLUTION ORAL DAILY
COMMUNITY
Start: 2024-08-07

## 2025-04-18 RX ORDER — ROFLUMILAST 500 UG/1
500 TABLET ORAL DAILY
COMMUNITY
Start: 2024-10-11

## 2025-04-18 RX ORDER — PHENOL 1.4 %
1 AEROSOL, SPRAY (ML) MUCOUS MEMBRANE DAILY
COMMUNITY

## 2025-04-18 NOTE — PROGRESS NOTES
[facility] PRE-OPERATIVE INSTRUCTIONS       DOS: __4/30/2025__        Pre-Op Instructions     Patients receiving local anesthetic only will arrive one hour prior to the procedure, all other patients will arrive 1.5 hours prior to procedure time.    [x]  A History and Physical will be required within 30 days prior to surgery date. Some patients may require cardiac or pulmonary clearance. H&P will be completed DOS for Endo/colonoscopy patients.     [x]  Reviewed Medical and Surgical history, medication list, confirmed with patient any implants, allergies, bleeding disorders, ALLY and reactions to Anesthesia.    [x]  If there is a change in physical condition between now and the day of surgery, please notify your surgeon. This includes a cough, cold, fever, sore throat, nausea, vomiting and diarrhea. Also notify your surgeon if you experience dizziness, shortness of breath or blurred vision.    [x] Reviewed hx of C-Diff, MRSA, VRE and/or recent use of Antibiotics     [x]  All patients having a procedure must have a ride home by a responsible person that is over the age of 18 and ensure it is someone that we can share medical information with. After discharge, a responsible adult needs to stay with you for 24 hours. There is a limit of 2 adult visitors per room.     If unable to secure ride and/or care taker, please contact surgeon's office.      [x]  No alcohol, smoking or marijuana use 24 hours prior to surgery. Any use of recreational drugs must be stopped 5 days prior to surgery.     [x]  NPO after midnight (Any heart, BP, seizure, thyroid and breathing medications are okay to take the morning of surgery with a small sip of water 4 hours prior to procedure).    The morning of surgery, you may brush your teeth, just no swallowing water. Also, NO gum, candy, mints or ice chips.    [x]  For Colonoscopy's, follow prep-instructions as indicated by physician.     []  Patients with a insulin pump, keep set

## 2025-04-29 ENCOUNTER — ANESTHESIA EVENT (OUTPATIENT)
Age: 80
End: 2025-04-29
Payer: MEDICARE

## 2025-04-30 ENCOUNTER — ANESTHESIA (OUTPATIENT)
Age: 80
End: 2025-04-30
Payer: MEDICARE

## 2025-04-30 ENCOUNTER — HOSPITAL ENCOUNTER (OUTPATIENT)
Age: 80
Setting detail: OUTPATIENT SURGERY
Discharge: HOME | End: 2025-04-30
Attending: INTERNAL MEDICINE | Admitting: INTERNAL MEDICINE
Payer: MEDICARE

## 2025-04-30 VITALS
WEIGHT: 180 LBS | TEMPERATURE: 97.6 F | HEIGHT: 62 IN | DIASTOLIC BLOOD PRESSURE: 85 MMHG | HEART RATE: 73 BPM | SYSTOLIC BLOOD PRESSURE: 164 MMHG | RESPIRATION RATE: 14 BRPM | OXYGEN SATURATION: 100 % | BODY MASS INDEX: 33.13 KG/M2

## 2025-04-30 DIAGNOSIS — Z86.0100 HISTORY OF COLON POLYPS: ICD-10-CM

## 2025-04-30 PROCEDURE — 6360000002 HC RX W HCPCS: Performed by: NURSE ANESTHETIST, CERTIFIED REGISTERED

## 2025-04-30 PROCEDURE — 88305 TISSUE EXAM BY PATHOLOGIST: CPT

## 2025-04-30 PROCEDURE — 3700000001 HC ADD 15 MINUTES (ANESTHESIA): Performed by: INTERNAL MEDICINE

## 2025-04-30 PROCEDURE — 2580000003 HC RX 258: Performed by: NURSE ANESTHETIST, CERTIFIED REGISTERED

## 2025-04-30 PROCEDURE — 3700000000 HC ANESTHESIA ATTENDED CARE: Performed by: INTERNAL MEDICINE

## 2025-04-30 PROCEDURE — 7100000011 HC PHASE II RECOVERY - ADDTL 15 MIN: Performed by: INTERNAL MEDICINE

## 2025-04-30 PROCEDURE — 2709999900 HC NON-CHARGEABLE SUPPLY: Performed by: INTERNAL MEDICINE

## 2025-04-30 PROCEDURE — 2720000010 HC SURG SUPPLY STERILE: Performed by: INTERNAL MEDICINE

## 2025-04-30 PROCEDURE — 7100000010 HC PHASE II RECOVERY - FIRST 15 MIN: Performed by: INTERNAL MEDICINE

## 2025-04-30 PROCEDURE — 3609010600 HC COLONOSCOPY POLYPECTOMY SNARE/COLD BIOPSY: Performed by: INTERNAL MEDICINE

## 2025-04-30 DEVICE — REPLAY HEMOSTASIS CLIP, 16MM SPAN
Type: IMPLANTABLE DEVICE | Site: ASCENDING COLON | Status: FUNCTIONAL
Brand: REPLAY

## 2025-04-30 RX ORDER — SODIUM CHLORIDE 9 MG/ML
INJECTION, SOLUTION INTRAVENOUS
Status: DISCONTINUED | OUTPATIENT
Start: 2025-04-30 | End: 2025-04-30 | Stop reason: SDUPTHER

## 2025-04-30 RX ORDER — NALOXONE HYDROCHLORIDE 0.4 MG/ML
INJECTION, SOLUTION INTRAMUSCULAR; INTRAVENOUS; SUBCUTANEOUS PRN
Status: DISCONTINUED | OUTPATIENT
Start: 2025-04-30 | End: 2025-04-30 | Stop reason: HOSPADM

## 2025-04-30 RX ORDER — LIDOCAINE HYDROCHLORIDE 20 MG/ML
INJECTION, SOLUTION EPIDURAL; INFILTRATION; INTRACAUDAL; PERINEURAL
Status: DISCONTINUED | OUTPATIENT
Start: 2025-04-30 | End: 2025-04-30 | Stop reason: SDUPTHER

## 2025-04-30 RX ORDER — SODIUM CHLORIDE 0.9 % (FLUSH) 0.9 %
5-40 SYRINGE (ML) INJECTION EVERY 12 HOURS SCHEDULED
Status: DISCONTINUED | OUTPATIENT
Start: 2025-04-30 | End: 2025-04-30 | Stop reason: HOSPADM

## 2025-04-30 RX ORDER — PROPOFOL 10 MG/ML
INJECTION, EMULSION INTRAVENOUS
Status: DISCONTINUED | OUTPATIENT
Start: 2025-04-30 | End: 2025-04-30 | Stop reason: SDUPTHER

## 2025-04-30 RX ORDER — SODIUM CHLORIDE 9 MG/ML
INJECTION, SOLUTION INTRAVENOUS PRN
Status: DISCONTINUED | OUTPATIENT
Start: 2025-04-30 | End: 2025-04-30 | Stop reason: HOSPADM

## 2025-04-30 RX ORDER — SODIUM CHLORIDE 0.9 % (FLUSH) 0.9 %
5-40 SYRINGE (ML) INJECTION PRN
Status: DISCONTINUED | OUTPATIENT
Start: 2025-04-30 | End: 2025-04-30 | Stop reason: HOSPADM

## 2025-04-30 RX ADMIN — PROPOFOL 60 MG: 10 INJECTION, EMULSION INTRAVENOUS at 11:48

## 2025-04-30 RX ADMIN — LIDOCAINE HYDROCHLORIDE 100 MG: 20 INJECTION, SOLUTION EPIDURAL; INFILTRATION; INTRACAUDAL; PERINEURAL at 11:48

## 2025-04-30 RX ADMIN — SODIUM CHLORIDE: 9 INJECTION, SOLUTION INTRAVENOUS at 11:39

## 2025-04-30 RX ADMIN — PROPOFOL 160 MCG/KG/MIN: 10 INJECTION, EMULSION INTRAVENOUS at 11:49

## 2025-04-30 ASSESSMENT — PAIN - FUNCTIONAL ASSESSMENT: PAIN_FUNCTIONAL_ASSESSMENT: 0-10

## 2025-04-30 ASSESSMENT — COPD QUESTIONNAIRES: CAT_SEVERITY: SEVERE

## 2025-04-30 ASSESSMENT — ENCOUNTER SYMPTOMS: SHORTNESS OF BREATH: 0

## 2025-04-30 NOTE — DISCHARGE INSTRUCTIONS

## 2025-04-30 NOTE — PROGRESS NOTES
Ambulatory Surgery/Procedure Discharge Note    Vitals:    04/30/25 1310   BP: (!) 164/85   Pulse: 73   Resp: 14   Temp:    SpO2: 100%       Patient discharged to home/self care. Patient discharged via wheel chair by transporter to waiting family/S.O.       4/30/2025 [id number]

## 2025-04-30 NOTE — ANESTHESIA POSTPROCEDURE EVALUATION
Department of Anesthesiology  Postprocedure Note    Patient: Tiffani Sher  MRN: [id number]  YOB: 1945  Date of evaluation: 4/30/2025    Procedure Summary       Date: 04/30/25 Room / Location: [address]    Anesthesia Start: 1140 Anesthesia Stop: 1234    Procedure: COLONOSCOPY POLYPECTOMY SNARE/BIOPSY Diagnosis:       History of colon polyps      (History of colon polyps [Z86.0100])    Surgeons: Nestor Fernandes MD Responsible Provider: Shiva Beltran MD    Anesthesia Type: MAC ASA Status: 3            Anesthesia Type: No value filed.    Michel Phase I: Michel Score: 10    Michel Phase II: Michel Score: 10    Anesthesia Post Evaluation    Patient location during evaluation: PACU  Patient participation: complete - patient participated  Level of consciousness: awake and alert  Pain score: 0  Airway patency: patent  Nausea & Vomiting: no nausea and no vomiting  Cardiovascular status: blood pressure returned to baseline  Respiratory status: acceptable  Hydration status: euvolemic  Pain management: adequate    No notable events documented.

## 2025-04-30 NOTE — H&P
Gastroenterology Note             Pre-operative History and Physical    Patient: Tiffani Sher  : 1945  CSN:     History Obtained From:  patient and/or guardian.     HISTORY OF PRESENT ILLNESS:    The patient is a 80 y.o. female  here for /colonoscopy.     Is a very pleasant 80-year-old female comes in today she had a colonoscopy previously by myself but her prep was what we would like and we could not really tell if she had colon polyps or not so today she is repeating her colonoscopy    Past Medical History:    Past Medical History:   Diagnosis Date    Adrenal insufficiency     CKD (chronic kidney disease)     COPD (chronic obstructive pulmonary disease) (HCC)     Depression     GERD (gastroesophageal reflux disease)     Hyperlipidemia     Hypertension     Sleep apnea     CPAP at night    Tracheomalacia      Past Surgical History:    Past Surgical History:   Procedure Laterality Date    BACK SURGERY      BLADDER TUMOR EXCISION      CARPAL TUNNEL RELEASE Bilateral     Twice    COLONOSCOPY N/A 2022    COLONOSCOPY POLYPECTOMY SNARE/COLD BIOPSY performed by Nestor Fernandes MD at [facility] ENDOSCOPY    COLONOSCOPY N/A 2024    COLONOSCOPY BIOPSY performed by Nestor Fernandes MD at [facility] ENDOSCOPY    JOINT REPLACEMENT Right     Hip    JOINT REPLACEMENT Left     Knee    KNEE ARTHROSCOPY Right     LAMINECTOMY      SPINAL CORD STIMULATOR SURGERY  2024    SPINAL FUSION      cervical    UPPER GASTROINTESTINAL ENDOSCOPY N/A 2022    EGD BIOPSY performed by Nestor Fernandes MD at [facility] ENDOSCOPY    UPPER GASTROINTESTINAL ENDOSCOPY N/A 2022    EGD CONTROL HEMORRHAGE performed by Nestor Fernandes MD at [facility] ENDOSCOPY    UPPER GASTROINTESTINAL ENDOSCOPY N/A 2024    ESOPHAGOGASTRODUODENOSCOPY BIOPSY performed by Nestor Fernandes MD at [facility] ENDOSCOPY    UPPER GASTROINTESTINAL ENDOSCOPY N/A 2024    ESOPHAGOGASTRODUODENOSCOPY DILATATION performed by Nestor Fernandes MD at [facility] ENDOSCOPY     Medications Prior to

## 2025-04-30 NOTE — ANESTHESIA PRE PROCEDURE
Department of Anesthesiology  Preprocedure Note       Name:  Tiffani Sher   Age:  80 y.o.  :  1945                                          MRN:  [id number]         Date:  2025      Surgeon: Surgeon(s):  Nestor Fernandes MD    Procedure: Procedure(s):  COLONOSCOPY    Medications prior to admission:   Prior to Admission medications    Medication Sig Start Date End Date Taking? Authorizing Provider   Roflumilast (DALIRESP) 500 MCG tablet Take 1 tablet by mouth daily 10/11/24  Yes Briseida Valle MD   polyethylene glycol (GLYCOLAX) 17 GM/SCOOP powder Take 17 g by mouth daily 24  Yes Provider, MD Briseida   nitroGLYCERIN (NITROSTAT) 0.4 MG SL tablet Dissolve 1 tablet under the tongue for chest pain.  Repeat if needed every 5 minutes for 2 more doses. 25  Yes Briseida Valle MD   rosuvastatin (CRESTOR) 20 MG tablet Take 1 tablet by mouth nightly 25  Yes ProviderBriseida MD   aspirin 81 MG EC tablet Take 1 tablet by mouth daily 25  Yes Provider, MD Briseida   calcium carbonate 600 MG TABS tablet Take 1 tablet by mouth daily   Yes Provider, MD Briseida   carboxymethylcellulose 1 % ophthalmic solution 1 drop 3 times daily   Yes Provider, MD Briseida   cetirizine (ZYRTEC) 10 MG tablet Take 1 tablet by mouth daily   Yes Provider, MD Briseida   losartan (COZAAR) 25 MG tablet Take 1 tablet by mouth daily   Yes Provider, MD Briseida   omeprazole (PRILOSEC) 40 MG delayed release capsule Take 1 capsule by mouth daily 23  Yes ProviderBriseida MD   DULoxetine (CYMBALTA) 60 MG extended release capsule Take 1 capsule by mouth nightly 18  Yes Provider, MD Briseida   acetaminophen (TYLENOL) 500 MG tablet Take 1 tablet by mouth every 6 hours as needed   Yes Briseida Valle MD   albuterol sulfate HFA (PROVENTIL;VENTOLIN;PROAIR) 108 (90 Base) MCG/ACT inhaler INHALE TWO PUFFS BY MOUTH into THE lungs EVERY 6 HOURS AS NEEDED wheezing 11/5/15  Yes Provider

## 2025-04-30 NOTE — PROGRESS NOTES
Pt arrived to preop room # 6 from endo. Report received from Endo RN and CRNA. Pt resting quietly in bed, respirations even and unlabored. Attached to Preop monitoring system. Alarms and parameters set. Call light in reach.

## 2025-05-02 LAB — SURGICAL PATHOLOGY REPORT: NORMAL

## 2025-05-06 ENCOUNTER — TRANSCRIBE ORDERS (OUTPATIENT)
Dept: ADMINISTRATIVE | Age: 80
End: 2025-05-06

## 2025-05-06 DIAGNOSIS — K57.92 DIVERTICULITIS: Primary | ICD-10-CM

## 2025-05-08 ENCOUNTER — HOSPITAL ENCOUNTER (OUTPATIENT)
Age: 80
Discharge: HOME | End: 2025-05-08
Attending: INTERNAL MEDICINE
Payer: MEDICARE

## 2025-05-08 DIAGNOSIS — K57.92 DIVERTICULITIS: ICD-10-CM

## 2025-05-08 LAB
EGFR, POC: 58 ML/MIN/1.73M2
POC CREATININE: 1 MG/DL (ref 0.6–1.2)

## 2025-05-08 PROCEDURE — 6360000004 HC RX CONTRAST MEDICATION: Performed by: INTERNAL MEDICINE

## 2025-05-08 PROCEDURE — 74177 CT ABD & PELVIS W/CONTRAST: CPT

## 2025-05-08 PROCEDURE — 82565 ASSAY OF CREATININE: CPT

## 2025-05-08 RX ORDER — IOPAMIDOL 755 MG/ML
75 INJECTION, SOLUTION INTRAVASCULAR
Status: COMPLETED | OUTPATIENT
Start: 2025-05-08 | End: 2025-05-08

## 2025-05-08 RX ORDER — DIATRIZOATE MEGLUMINE AND DIATRIZOATE SODIUM 660; 100 MG/ML; MG/ML
30 SOLUTION ORAL; RECTAL
Status: DISCONTINUED | OUTPATIENT
Start: 2025-05-08 | End: 2025-05-09 | Stop reason: HOSPADM

## 2025-05-08 RX ADMIN — DIATRIZOATE MEGLUMINE AND DIATRIZOATE SODIUM 30 ML: 660; 100 LIQUID ORAL; RECTAL at 15:11

## 2025-05-08 RX ADMIN — IOPAMIDOL 75 ML: 755 INJECTION, SOLUTION INTRAVENOUS at 15:11

## 2025-06-10 ENCOUNTER — HOSPITAL ENCOUNTER (INPATIENT)
Age: 80
LOS: 4 days | Discharge: HOME | End: 2025-06-14
Attending: HOSPITALIST | Admitting: HOSPITALIST
Payer: MEDICARE

## 2025-06-10 ENCOUNTER — APPOINTMENT (OUTPATIENT)
Age: 80
End: 2025-06-10
Payer: MEDICARE

## 2025-06-10 DIAGNOSIS — E87.1 HYPONATREMIA: Primary | ICD-10-CM

## 2025-06-10 DIAGNOSIS — R13.10 DYSPHAGIA, UNSPECIFIED TYPE: ICD-10-CM

## 2025-06-10 DIAGNOSIS — R10.13 ABDOMINAL PAIN, EPIGASTRIC: ICD-10-CM

## 2025-06-10 PROBLEM — R10.84 GENERALIZED ABDOMINAL PAIN: Status: ACTIVE | Noted: 2025-06-10

## 2025-06-10 LAB
ALBUMIN SERPL-MCNC: 4 G/DL (ref 3.4–5)
ALBUMIN/GLOB SERPL: 1.9 {RATIO}
ALP SERPL-CCNC: 65 U/L (ref 40–129)
ALT SERPL-CCNC: 22 U/L (ref 10–40)
ANION GAP SERPL CALCULATED.3IONS-SCNC: 11 MMOL/L (ref 3–16)
ANION GAP SERPL CALCULATED.3IONS-SCNC: 9 MMOL/L (ref 3–16)
AST SERPL-CCNC: 15 U/L (ref 15–37)
BACTERIA URNS QL MICRO: ABNORMAL
BASOPHILS # BLD: 0.01 K/UL (ref 0–0.2)
BASOPHILS NFR BLD: 0 %
BILIRUB SERPL-MCNC: 0.6 MG/DL (ref 0–1)
BILIRUB UR QL STRIP: NEGATIVE
BUN SERPL-MCNC: 20 MG/DL (ref 7–20)
BUN SERPL-MCNC: 22 MG/DL (ref 7–20)
CALCIUM SERPL-MCNC: 8.4 MG/DL (ref 8.3–10.6)
CALCIUM SERPL-MCNC: 8.8 MG/DL (ref 8.3–10.6)
CHARACTER UR: ABNORMAL
CHLORIDE SERPL-SCNC: 88 MMOL/L (ref 99–110)
CHLORIDE SERPL-SCNC: 90 MMOL/L (ref 99–110)
CLARITY UR: CLEAR
CO2 SERPL-SCNC: 20 MMOL/L (ref 21–32)
CO2 SERPL-SCNC: 23 MMOL/L (ref 21–32)
COLOR UR: YELLOW
CREAT SERPL-MCNC: 0.9 MG/DL (ref 0.6–1.2)
CREAT SERPL-MCNC: 1.1 MG/DL (ref 0.6–1.2)
EOSINOPHIL # BLD: 0.01 K/UL (ref 0–0.6)
EOSINOPHILS RELATIVE PERCENT: 0 %
EPI CELLS #/AREA URNS HPF: ABNORMAL /HPF
ERYTHROCYTE [DISTWIDTH] IN BLOOD BY AUTOMATED COUNT: 13.3 % (ref 12.4–15.4)
GFR, ESTIMATED: 53 ML/MIN/1.73M2
GFR, ESTIMATED: 63 ML/MIN/1.73M2
GLUCOSE SERPL-MCNC: 100 MG/DL (ref 70–99)
GLUCOSE SERPL-MCNC: 130 MG/DL (ref 70–99)
GLUCOSE UR STRIP-MCNC: NEGATIVE MG/DL
HCT VFR BLD AUTO: 39.3 % (ref 36–48)
HGB BLD-MCNC: 13.9 G/DL (ref 12–16)
HGB UR QL STRIP.AUTO: ABNORMAL
IMM GRANULOCYTES # BLD AUTO: 0.21 K/UL (ref 0–0.5)
IMM GRANULOCYTES NFR BLD: 1 %
KETONES UR STRIP-MCNC: NEGATIVE MG/DL
LEUKOCYTE ESTERASE UR QL STRIP: ABNORMAL
LIPASE SERPL-CCNC: 19 U/L (ref 13–60)
LYMPHOCYTES NFR BLD: 0.46 K/UL (ref 1–5.1)
LYMPHOCYTES RELATIVE PERCENT: 3 %
MAGNESIUM SERPL-MCNC: 1.8 MG/DL (ref 1.8–2.4)
MCH RBC QN AUTO: 29.6 PG (ref 26–34)
MCHC RBC AUTO-ENTMCNC: 35.4 G/DL (ref 31–36)
MCV RBC AUTO: 83.6 FL (ref 80–100)
MONOCYTES NFR BLD: 0.53 K/UL (ref 0–1.3)
MONOCYTES NFR BLD: 3 %
NEUTROPHILS NFR BLD: 92 %
NEUTS SEG NFR BLD: 14.52 K/UL (ref 1.7–7.7)
NITRITE UR QL STRIP: NEGATIVE
PH UR STRIP: 6.5 [PH] (ref 5–8)
PLATELET # BLD AUTO: 239 K/UL (ref 135–450)
PMV BLD AUTO: 9.5 FL
POTASSIUM SERPL-SCNC: 4.2 MMOL/L (ref 3.5–5.1)
POTASSIUM SERPL-SCNC: 4.6 MMOL/L (ref 3.5–5.1)
PROT SERPL-MCNC: 6.2 G/DL (ref 6.4–8.2)
PROT UR STRIP-MCNC: ABNORMAL MG/DL
RBC # BLD AUTO: 4.7 M/UL (ref 4–5.2)
RBC #/AREA URNS HPF: ABNORMAL /HPF
SODIUM SERPL-SCNC: 120 MMOL/L (ref 136–145)
SODIUM SERPL-SCNC: 121 MMOL/L (ref 136–145)
SP GR UR STRIP: 1.01 (ref 1–1.03)
UROBILINOGEN UR STRIP-ACNC: 1 EU/DL (ref 0–1)
WBC #/AREA URNS HPF: ABNORMAL /HPF
WBC OTHER # BLD: 15.7 K/UL (ref 4–11)

## 2025-06-10 PROCEDURE — 2060000000 HC ICU INTERMEDIATE R&B

## 2025-06-10 PROCEDURE — 6370000000 HC RX 637 (ALT 250 FOR IP): Performed by: NURSE PRACTITIONER

## 2025-06-10 PROCEDURE — 6370000000 HC RX 637 (ALT 250 FOR IP): Performed by: STUDENT IN AN ORGANIZED HEALTH CARE EDUCATION/TRAINING PROGRAM

## 2025-06-10 PROCEDURE — 94660 CPAP INITIATION&MGMT: CPT

## 2025-06-10 PROCEDURE — 85025 COMPLETE CBC W/AUTO DIFF WBC: CPT

## 2025-06-10 PROCEDURE — 87077 CULTURE AEROBIC IDENTIFY: CPT

## 2025-06-10 PROCEDURE — 81001 URINALYSIS AUTO W/SCOPE: CPT

## 2025-06-10 PROCEDURE — 99285 EMERGENCY DEPT VISIT HI MDM: CPT

## 2025-06-10 PROCEDURE — 6360000002 HC RX W HCPCS: Performed by: NURSE PRACTITIONER

## 2025-06-10 PROCEDURE — 6370000000 HC RX 637 (ALT 250 FOR IP): Performed by: INTERNAL MEDICINE

## 2025-06-10 PROCEDURE — 80048 BASIC METABOLIC PNL TOTAL CA: CPT

## 2025-06-10 PROCEDURE — 87086 URINE CULTURE/COLONY COUNT: CPT

## 2025-06-10 PROCEDURE — 71250 CT THORAX DX C-: CPT

## 2025-06-10 PROCEDURE — 36415 COLL VENOUS BLD VENIPUNCTURE: CPT

## 2025-06-10 PROCEDURE — 83690 ASSAY OF LIPASE: CPT

## 2025-06-10 PROCEDURE — 6370000000 HC RX 637 (ALT 250 FOR IP): Performed by: PHYSICIAN ASSISTANT

## 2025-06-10 PROCEDURE — 2500000003 HC RX 250 WO HCPCS: Performed by: NURSE PRACTITIONER

## 2025-06-10 PROCEDURE — 83735 ASSAY OF MAGNESIUM: CPT

## 2025-06-10 PROCEDURE — 80053 COMPREHEN METABOLIC PANEL: CPT

## 2025-06-10 RX ORDER — ONDANSETRON 2 MG/ML
4 INJECTION INTRAMUSCULAR; INTRAVENOUS EVERY 6 HOURS PRN
Status: DISCONTINUED | OUTPATIENT
Start: 2025-06-10 | End: 2025-06-14 | Stop reason: HOSPADM

## 2025-06-10 RX ORDER — PANTOPRAZOLE SODIUM 40 MG/1
40 TABLET, DELAYED RELEASE ORAL
Status: DISCONTINUED | OUTPATIENT
Start: 2025-06-11 | End: 2025-06-14 | Stop reason: HOSPADM

## 2025-06-10 RX ORDER — TIRZEPATIDE 2.5 MG/.5ML
2.5 INJECTION, SOLUTION SUBCUTANEOUS WEEKLY
COMMUNITY

## 2025-06-10 RX ORDER — SODIUM CHLORIDE 0.9 % (FLUSH) 0.9 %
5-40 SYRINGE (ML) INJECTION EVERY 12 HOURS SCHEDULED
Status: DISCONTINUED | OUTPATIENT
Start: 2025-06-10 | End: 2025-06-14 | Stop reason: HOSPADM

## 2025-06-10 RX ORDER — ALBUTEROL SULFATE 0.83 MG/ML
2.5 SOLUTION RESPIRATORY (INHALATION) EVERY 4 HOURS PRN
Status: DISCONTINUED | OUTPATIENT
Start: 2025-06-10 | End: 2025-06-14 | Stop reason: HOSPADM

## 2025-06-10 RX ORDER — CARBOXYMETHYLCELLULOSE SODIUM 5 MG/ML
1 SOLUTION/ DROPS OPHTHALMIC 3 TIMES DAILY
Status: DISCONTINUED | OUTPATIENT
Start: 2025-06-10 | End: 2025-06-14 | Stop reason: HOSPADM

## 2025-06-10 RX ORDER — DULOXETIN HYDROCHLORIDE 30 MG/1
60 CAPSULE, DELAYED RELEASE ORAL NIGHTLY
Status: DISCONTINUED | OUTPATIENT
Start: 2025-06-11 | End: 2025-06-14 | Stop reason: HOSPADM

## 2025-06-10 RX ORDER — MAGNESIUM HYDROXIDE/ALUMINUM HYDROXICE/SIMETHICONE 120; 1200; 1200 MG/30ML; MG/30ML; MG/30ML
30 SUSPENSION ORAL EVERY 6 HOURS PRN
Status: DISCONTINUED | OUTPATIENT
Start: 2025-06-10 | End: 2025-06-14 | Stop reason: HOSPADM

## 2025-06-10 RX ORDER — HYDROCORTISONE 10 MG/1
5 TABLET ORAL NIGHTLY
Status: DISCONTINUED | OUTPATIENT
Start: 2025-06-10 | End: 2025-06-14 | Stop reason: HOSPADM

## 2025-06-10 RX ORDER — SODIUM CHLORIDE 1 G/1
1 TABLET ORAL
Status: DISCONTINUED | OUTPATIENT
Start: 2025-06-10 | End: 2025-06-14 | Stop reason: HOSPADM

## 2025-06-10 RX ORDER — HYDROCORTISONE 10 MG/1
15 TABLET ORAL DAILY
Status: DISCONTINUED | OUTPATIENT
Start: 2025-06-11 | End: 2025-06-10 | Stop reason: SDUPTHER

## 2025-06-10 RX ORDER — ACETAMINOPHEN 650 MG/1
650 SUPPOSITORY RECTAL EVERY 6 HOURS PRN
Status: DISCONTINUED | OUTPATIENT
Start: 2025-06-10 | End: 2025-06-14 | Stop reason: HOSPADM

## 2025-06-10 RX ORDER — ARFORMOTEROL TARTRATE 15 UG/2ML
15 SOLUTION RESPIRATORY (INHALATION)
Status: DISCONTINUED | OUTPATIENT
Start: 2025-06-11 | End: 2025-06-14 | Stop reason: HOSPADM

## 2025-06-10 RX ORDER — LORAZEPAM 2 MG/1
2 TABLET ORAL EVERY 6 HOURS PRN
COMMUNITY

## 2025-06-10 RX ORDER — ONDANSETRON 4 MG/1
4 TABLET, ORALLY DISINTEGRATING ORAL EVERY 8 HOURS PRN
Status: DISCONTINUED | OUTPATIENT
Start: 2025-06-10 | End: 2025-06-14 | Stop reason: HOSPADM

## 2025-06-10 RX ORDER — METOPROLOL SUCCINATE 25 MG/1
25 TABLET, EXTENDED RELEASE ORAL DAILY
COMMUNITY

## 2025-06-10 RX ORDER — HYDROCORTISONE 10 MG/1
15 TABLET ORAL EVERY MORNING
Status: DISCONTINUED | OUTPATIENT
Start: 2025-06-11 | End: 2025-06-14 | Stop reason: HOSPADM

## 2025-06-10 RX ORDER — FAMOTIDINE 20 MG/1
40 TABLET, FILM COATED ORAL ONCE
Status: COMPLETED | OUTPATIENT
Start: 2025-06-10 | End: 2025-06-10

## 2025-06-10 RX ORDER — LOSARTAN POTASSIUM 25 MG/1
25 TABLET ORAL DAILY
Status: DISCONTINUED | OUTPATIENT
Start: 2025-06-11 | End: 2025-06-10 | Stop reason: DRUGHIGH

## 2025-06-10 RX ORDER — ENOXAPARIN SODIUM 100 MG/ML
40 INJECTION SUBCUTANEOUS DAILY
Status: DISCONTINUED | OUTPATIENT
Start: 2025-06-10 | End: 2025-06-14 | Stop reason: HOSPADM

## 2025-06-10 RX ORDER — ROSUVASTATIN CALCIUM 20 MG/1
20 TABLET, COATED ORAL NIGHTLY
Status: DISCONTINUED | OUTPATIENT
Start: 2025-06-11 | End: 2025-06-14 | Stop reason: HOSPADM

## 2025-06-10 RX ORDER — LOSARTAN POTASSIUM 50 MG/1
100 TABLET ORAL DAILY
Status: DISCONTINUED | OUTPATIENT
Start: 2025-06-11 | End: 2025-06-14 | Stop reason: HOSPADM

## 2025-06-10 RX ORDER — SODIUM CHLORIDE 0.9 % (FLUSH) 0.9 %
5-40 SYRINGE (ML) INJECTION PRN
Status: DISCONTINUED | OUTPATIENT
Start: 2025-06-10 | End: 2025-06-14 | Stop reason: HOSPADM

## 2025-06-10 RX ORDER — POTASSIUM CHLORIDE 1500 MG/1
40 TABLET, EXTENDED RELEASE ORAL PRN
Status: DISCONTINUED | OUTPATIENT
Start: 2025-06-10 | End: 2025-06-14 | Stop reason: HOSPADM

## 2025-06-10 RX ORDER — LORAZEPAM 1 MG/1
1 TABLET ORAL ONCE
Status: COMPLETED | OUTPATIENT
Start: 2025-06-10 | End: 2025-06-10

## 2025-06-10 RX ORDER — POTASSIUM CHLORIDE 7.45 MG/ML
10 INJECTION INTRAVENOUS PRN
Status: DISCONTINUED | OUTPATIENT
Start: 2025-06-10 | End: 2025-06-14 | Stop reason: HOSPADM

## 2025-06-10 RX ORDER — SODIUM CHLORIDE 9 MG/ML
INJECTION, SOLUTION INTRAVENOUS PRN
Status: DISCONTINUED | OUTPATIENT
Start: 2025-06-10 | End: 2025-06-14 | Stop reason: HOSPADM

## 2025-06-10 RX ORDER — MAGNESIUM SULFATE IN WATER 40 MG/ML
2000 INJECTION, SOLUTION INTRAVENOUS PRN
Status: DISCONTINUED | OUTPATIENT
Start: 2025-06-10 | End: 2025-06-14 | Stop reason: HOSPADM

## 2025-06-10 RX ORDER — POLYETHYLENE GLYCOL 3350 17 G/17G
17 POWDER, FOR SOLUTION ORAL DAILY PRN
Status: DISCONTINUED | OUTPATIENT
Start: 2025-06-10 | End: 2025-06-14 | Stop reason: HOSPADM

## 2025-06-10 RX ORDER — LOSARTAN POTASSIUM 100 MG/1
100 TABLET ORAL DAILY
COMMUNITY

## 2025-06-10 RX ORDER — ACETAMINOPHEN 325 MG/1
650 TABLET ORAL EVERY 6 HOURS PRN
Status: DISCONTINUED | OUTPATIENT
Start: 2025-06-10 | End: 2025-06-14 | Stop reason: HOSPADM

## 2025-06-10 RX ORDER — METOPROLOL SUCCINATE 25 MG/1
25 TABLET, EXTENDED RELEASE ORAL DAILY
Status: DISCONTINUED | OUTPATIENT
Start: 2025-06-11 | End: 2025-06-14 | Stop reason: HOSPADM

## 2025-06-10 RX ORDER — ROFLUMILAST 500 UG/1
500 TABLET ORAL DAILY
Status: DISCONTINUED | OUTPATIENT
Start: 2025-06-11 | End: 2025-06-14 | Stop reason: HOSPADM

## 2025-06-10 RX ORDER — BUDESONIDE 0.5 MG/2ML
1 INHALANT ORAL
Status: DISCONTINUED | OUTPATIENT
Start: 2025-06-11 | End: 2025-06-14 | Stop reason: HOSPADM

## 2025-06-10 RX ADMIN — FAMOTIDINE 40 MG: 20 TABLET, FILM COATED ORAL at 14:12

## 2025-06-10 RX ADMIN — LIDOCAINE HYDROCHLORIDE 40 ML: 20 SOLUTION ORAL at 14:14

## 2025-06-10 RX ADMIN — HYDROCORTISONE 5 MG: 10 TABLET ORAL at 20:59

## 2025-06-10 RX ADMIN — ENOXAPARIN SODIUM 40 MG: 100 INJECTION SUBCUTANEOUS at 22:32

## 2025-06-10 RX ADMIN — Medication 10 ML: at 20:59

## 2025-06-10 RX ADMIN — LORAZEPAM 1 MG: 1 TABLET ORAL at 22:32

## 2025-06-10 RX ADMIN — Medication 1 G: at 18:06

## 2025-06-10 RX ADMIN — ACETAMINOPHEN 650 MG: 325 TABLET ORAL at 22:33

## 2025-06-10 ASSESSMENT — ENCOUNTER SYMPTOMS
TROUBLE SWALLOWING: 1
CHOKING: 0
SHORTNESS OF BREATH: 0
WHEEZING: 0
VOMITING: 0
DIARRHEA: 0
RHINORRHEA: 0
NAUSEA: 0
ABDOMINAL PAIN: 1
COUGH: 0

## 2025-06-10 ASSESSMENT — LIFESTYLE VARIABLES
HOW OFTEN DO YOU HAVE A DRINK CONTAINING ALCOHOL: NEVER
HOW MANY STANDARD DRINKS CONTAINING ALCOHOL DO YOU HAVE ON A TYPICAL DAY: PATIENT DOES NOT DRINK

## 2025-06-10 ASSESSMENT — PAIN DESCRIPTION - LOCATION
LOCATION: ABDOMEN
LOCATION: ABDOMEN

## 2025-06-10 ASSESSMENT — PAIN DESCRIPTION - PAIN TYPE: TYPE: ACUTE PAIN

## 2025-06-10 ASSESSMENT — PAIN SCALES - GENERAL
PAINLEVEL_OUTOF10: 6
PAINLEVEL_OUTOF10: 6
PAINLEVEL_OUTOF10: 0

## 2025-06-10 ASSESSMENT — PAIN - FUNCTIONAL ASSESSMENT: PAIN_FUNCTIONAL_ASSESSMENT: 0-10

## 2025-06-10 ASSESSMENT — PAIN DESCRIPTION - DESCRIPTORS: DESCRIPTORS: ACHING

## 2025-06-10 ASSESSMENT — PAIN DESCRIPTION - ORIENTATION: ORIENTATION: MID

## 2025-06-10 NOTE — ED PROVIDER NOTES
lungs EVERY 6 HOURS AS NEEDED wheezing    ASPIRIN 81 MG EC TABLET    Take 1 tablet by mouth daily    AZELASTINE (ASTELIN) 0.1 % NASAL SPRAY    1 spray by Nasal route 2 times daily    CALCIUM CARBONATE 600 MG TABS TABLET    Take 1 tablet by mouth daily    CARBOXYMETHYLCELLULOSE 1 % OPHTHALMIC SOLUTION    1 drop 3 times daily    CETIRIZINE (ZYRTEC) 10 MG TABLET    Take 1 tablet by mouth daily    CYCLOBENZAPRINE (FLEXERIL) 10 MG TABLET    TAKE 1 OR 2 TABLETS BY MOUTH AT BEDTIME AS NEEDED muscle SPASMS    DICYCLOMINE (BENTYL) 10 MG CAPSULE    TAKE ONE CAPSULE BY MOUTH EVERY DAY    DULOXETINE (CYMBALTA) 60 MG EXTENDED RELEASE CAPSULE    Take 1 capsule by mouth nightly    ERGOCALCIFEROL (ERGOCALCIFEROL) 1.25 MG (91925 UT) CAPSULE    Take 1 capsule by mouth    FLUTICASONE (VERAMYST) 27.5 MCG/SPRAY NASAL SPRAY    2 sprays by Nasal route daily    FLUTICASONE-UMECLIDIN-VILANT (TRELEGY ELLIPTA) 100-62.5-25 MCG/INH AEPB    Inhale 1 puff into the lungs daily    HYDROCORTISONE (CORTEF) 5 MG TABLET    Take 3 tablets by mouth every morning and take 1 tablet every afternoon. Take triple dose when sick and inform doctor of illness.    HYDROCORTISONE SODIUM SUCCINATE PF (SOLU-CORTEF) 100 MG INJECTION    jennifer 100mg intramuscular inj in emergency when oral hydrocortisone cannot be taken, inform MD    IPRATROPIUM (ATROVENT) 0.02 % NEBULIZER SOLUTION    USE 1 VIAL VIA NEBULIZER EVERY 6 HOURS AS NEEDED FOR SHORTNESS OF BREATH.    LOSARTAN (COZAAR) 25 MG TABLET    Take 1 tablet by mouth daily    NITROGLYCERIN (NITROSTAT) 0.4 MG SL TABLET    Dissolve 1 tablet under the tongue for chest pain.  Repeat if needed every 5 minutes for 2 more doses.    OMEPRAZOLE (PRILOSEC) 40 MG DELAYED RELEASE CAPSULE    Take 1 capsule by mouth daily    POLYETHYLENE GLYCOL (GLYCOLAX) 17 GM/SCOOP POWDER    Take 17 g by mouth daily    ROFLUMILAST (DALIRESP) 500 MCG TABLET    Take 1 tablet by mouth daily    ROSUVASTATIN (CRESTOR) 20 MG TABLET    Take 1 tablet by  focal reproducible tenderness or peritoneal signs.  Subjective generalized discomfort.  Given GI cocktail and Pepcid for symptomatic relief and will be reevaluated.    Disposition Considerations (tests considered but not done, Admit vs D/C, Shared Decision Making, Pt Expectation of Test or Tx.): CBC and CMP are remarkable for hyponatremia at 120.  White count of 15.7.  LFTs are within normal limits.  Lipase 19.  Urinalysis is negative.  CT of the chest abdomen pelvis with no acute findings.  I did consult nephrology who will put in additional orders regarding incidental hyponatremia.  She will be admitted for further evaluation management of this.  Hospitalist will resume care of the patient at this time.  Patient informed and agreeable.  She is stable for admission.      I am the Primary Clinician of Record.  FINAL IMPRESSION      1. Hyponatremia    2. Abdominal pain, epigastric    3. Dysphagia, unspecified type          DISPOSITION/PLAN     DISPOSITION Decision To Admit 06/10/2025 02:32:30 PM   DISPOSITION CONDITION Stable           PATIENT REFERRED TO:  No follow-up provider specified.    DISCHARGE MEDICATIONS:  New Prescriptions    No medications on file       DISCONTINUED MEDICATIONS:  Discontinued Medications    No medications on file              (Please note that portions of this note were completed with a voice recognition program.  Efforts were made to edit the dictations but occasionally words are mis-transcribed.)    Anne-Marie Boykin PA-C (electronically signed)        Anne-Marie Boykin PA-C  06/10/25 1970

## 2025-06-10 NOTE — PROGRESS NOTES
Renal consult received  Orders placed  Follow Na at 8pm  Goal Na in the next 24 hours not to exceed 128    Thank you

## 2025-06-10 NOTE — PROGRESS NOTES
4 Eyes Skin Assessment     NAME:  Tiffani Sher  YOB: 1945  MEDICAL RECORD NUMBER:  [id number]    The patient is being assessed for  Admission    I agree that at least one RN has performed a thorough Head to Toe Skin Assessment on the patient. ALL assessment sites listed below have been assessed.      Areas assessed by both nurses:    Head, Face, Ears, Shoulders, Back, Chest, Arms, Elbows, Hands, Sacrum. Buttock, Coccyx, Ischium, and Legs. Feet and Heels        Does the Patient have a Wound? No noted wound(s)       Tomas Prevention initiated by RN: No  Wound Care Orders initiated by RN: No    Pressure Injury (Stage 3,4, Unstageable, DTI, NWPT, and Complex wounds) if present, place Wound referral order by RN under : No    New Ostomies, if present place, Ostomy referral order under : No     Nurse 1 eSignature: Electronically signed by Kirti Burnett RN on 6/10/25 at 5:07 PM EDT    **SHARE this note so that the co-signing nurse can place an eSignature**    Nurse 2 eSignature: Electronically signed by Asuncion Grweal RN on 6/10/25 at 5:18 PM EDT eyes

## 2025-06-10 NOTE — ED NOTES
ED to Inpatient Handoff SBAR    Patient Name: Tiffani Sher   :  1945  80 y.o.   MRN:  [id number]  Preferred Name    ED Room #:    Family/Caregiver Present no     Chief Complaint Abdominal Pain (Patient complains of mid abdominal pain for several months now. Patient reports increased pain this morning. Patient denies n/v/d. )       Restraints no   Sitter no   Sepsis Risk Score    Isolation No active isolations   Fall Risk Assessment Presents to emergency department  because of falls (Syncope, seizure, or loss of consciousness): No, Age > 70: Yes, Altered Mental Status, Intoxication with alcohol or substance confusion (Disorientation, impaired judgment, poor safety awaremess, or inability to follow instructions): No, Impaired Mobility: Ambulates or transfers with assistive devices or assistance; Unable to ambulate or transer.: Yes, Nursing Judgement: Yes     Situation  Code Status: No Order No additional code details.    Allergies: Codeine, Hydrocodone-acetaminophen, Oxycodone-acetaminophen, Penicillins, Tramadol, Morphine, Fentanyl, Other/food, and Sulfa antibiotics  Weight: Patient Vitals for the past 96 hrs (Last 3 readings):   Weight   06/10/25 1259 80.3 kg (177 lb)     Arrived from: home  Hospital Problem/Diagnosis:  Principal Problem:    Hyponatremia  Active Problems:    Generalized abdominal pain  Resolved Problems:    * No resolved hospital problems. *    Imaging:   CT CHEST ABDOMEN PELVIS WO CONTRAST Additional Contrast? None   Final Result      1. No acute findings in the chest.         ABDOMEN AND PELVIS:      FINDINGS:      LIVER: No acute findings.      GALLBLADDER AND BILIARY TREE: Gallbladder not distended.  No biliary dilatation.      PANCREAS: Normal.      SPLEEN: Normal.      ADRENAL GLANDS: Normal.      KIDNEYS AND URETERS: Normal.      URINARY BLADDER: Normal.      BOWEL: Normal diameter, nonobstructed.  Appendix is normal. Endoscopic clips in the ascending aorta and transverse colon.  (VESICARE) 5 MG tablet Take 1 tablet by mouth daily      calcium carbonate 600 MG TABS tablet Take 1 tablet by mouth daily      carboxymethylcellulose 1 % ophthalmic solution 1 drop 3 times daily      cetirizine (ZYRTEC) 10 MG tablet Take 1 tablet by mouth daily      losartan (COZAAR) 25 MG tablet Take 1 tablet by mouth daily      omeprazole (PRILOSEC) 40 MG delayed release capsule Take 1 capsule by mouth daily      DULoxetine (CYMBALTA) 60 MG extended release capsule Take 1 capsule by mouth nightly      acetaminophen (TYLENOL) 500 MG tablet Take 1 tablet by mouth every 6 hours as needed      albuterol sulfate HFA (PROVENTIL;VENTOLIN;PROAIR) 108 (90 Base) MCG/ACT inhaler INHALE TWO PUFFS BY MOUTH into THE lungs EVERY 6 HOURS AS NEEDED wheezing      azelastine (ASTELIN) 0.1 % nasal spray 1 spray by Nasal route 2 times daily      cyclobenzaprine (FLEXERIL) 10 MG tablet TAKE 1 OR 2 TABLETS BY MOUTH AT BEDTIME AS NEEDED muscle SPASMS      ergocalciferol (ERGOCALCIFEROL) 1.25 MG (21270 UT) capsule Take 1 capsule by mouth      fluticasone (VERAMYST) 27.5 MCG/SPRAY nasal spray 2 sprays by Nasal route daily      fluticasone-umeclidin-vilant (TRELEGY ELLIPTA) 100-62.5-25 MCG/INH AEPB Inhale 1 puff into the lungs daily      hydrocortisone (CORTEF) 5 MG tablet Take 3 tablets by mouth every morning and take 1 tablet every afternoon. Take triple dose when sick and inform doctor of illness.      hydrocortisone sodium succinate PF (SOLU-CORTEF) 100 MG injection jennifer 100mg intramuscular inj in emergency when oral hydrocortisone cannot be taken, inform MD      ipratropium (ATROVENT) 0.02 % nebulizer solution USE 1 VIAL VIA NEBULIZER EVERY 6 HOURS AS NEEDED FOR SHORTNESS OF BREATH.      valACYclovir (VALTREX) 500 MG tablet Take 1 tablet by mouth daily as needed      dicyclomine (BENTYL) 10 MG capsule TAKE ONE CAPSULE BY MOUTH EVERY DAY            You may also review the ED PT Care Timeline found under the Summary Nursing Index

## 2025-06-10 NOTE — H&P
History and Physical      Name:  Tiffani Sher /Age/Sex: 1945  (80 y.o. female)   MRN & CSN:  [id number] & [id number] Admission Date/Time: 6/10/2025 12:50 PM   Location:   PCP: Not, On File (Inactive)       Hospital Day: 1             History of Present Illness:     Chief Complaint: Hyponatremia        Hospital Course    Tiffani Sher is a 80 y.o.  female, with past medical history significant for adrenal insufficiency, CKD, COPD, depression, GERD, hyperlipidemia, history of esophageal dilation   Sleep apnea Tracheomalacia, hypertension, who presented to the emergency room with complaint of upper abdominal pain.   The present condition started over the past several months.  Patient states she has had generalized abdominal pain more in the upper epigastric region worse after eating and sometimes improves with rest or heating pad.  She reports decreased appetite and poor oral intake.  States she also feels like she is having trouble swallowing and that liquids come right back up.  She denies any nausea, diarrhea.  States she is also been having difficulty initiating urine flow.  History of bladder cancer and follows with  urology. she has followed [facility] Dr. Fernandes in the past.  She had an EGD with dilation in 2024.  Patient recently had colonoscopy 2025, she reports she has an EGD scheduled this month.  Patient states symptoms have worsened and states she drinks liquids and they come right back up.  She also endorses having a poor appetite.    On Examination,the patient is able to state history and onset of symptoms.  States her abdominal pain has been ongoing but recently has worsened..  At the ED, vital signs;T 98.6,HR 80, RR 16, /97 mm/hg,SPO2 99% RA  Significant laboratories were the following: Sodium 120, chloride 88, CO2 20 BUN 22, GFR 53, glucose 130 WBC 15.7  UA indicative of UTI, WBC present 1+ bacteria, trace LE.  Urine culture pending  Imaging results CT chest  scarring in the left upper lobe and left lower lobe without significant change. Central airways patent. PLEURA: No pleural effusion or thickening. HEART AND GREAT VESSELS: Normal heart size. Unchanged trace pericardial fluid. Coronary artery calcification consistent with coronary artery disease. Moderate calcification of the ascending aorta.  Thoracic aorta and main pulmonary artery are normal in caliber. MEDIASTINUM: No lymphadenopathy. CHEST WALL / LOWER NECK: No significant abnormality. BONES: No significant abnormality.     1. No acute findings in the chest. ABDOMEN AND PELVIS: FINDINGS: LIVER: No acute findings. GALLBLADDER AND BILIARY TREE: Gallbladder not distended.  No biliary dilatation. PANCREAS: Normal. SPLEEN: Normal. ADRENAL GLANDS: Normal. KIDNEYS AND URETERS: Normal. URINARY BLADDER: Normal. BOWEL: Normal diameter, nonobstructed.  Appendix is normal. Endoscopic clips in the ascending aorta and transverse colon. REPRODUCTIVE ORGANS: No associated masses. LYMPH NODES: No abnormally enlarged nodes. PERITONEUM/RETROPERITONEUM: No ascites or free air. VESSELS: Aorta is normal in caliber. ABDOMINAL WALL: Normal. BONES: No significant abnormality. Lumbar spine fusion hardware is present. Right hip arthroplasty is noted. A thoracic spinal stimulator is present. IMPRESSION: 1. No acute findings in the abdomen or pelvis. Electronically signed by Bernardino Stevens MD         Electronically signed by ANGEL Arango CNP on 6/10/2025 at 3:52 PM

## 2025-06-11 LAB
ALBUMIN SERPL-MCNC: 3.8 G/DL (ref 3.4–5)
ALBUMIN/GLOB SERPL: 1.9 {RATIO}
ALP SERPL-CCNC: 54 U/L (ref 40–129)
ALT SERPL-CCNC: 20 U/L (ref 10–40)
ANION GAP SERPL CALCULATED.3IONS-SCNC: 13 MMOL/L (ref 3–16)
AST SERPL-CCNC: 13 U/L (ref 15–37)
BASOPHILS # BLD: 0 K/UL (ref 0–0.2)
BASOPHILS NFR BLD: 0 %
BILIRUB SERPL-MCNC: 0.5 MG/DL (ref 0–1)
BUN SERPL-MCNC: 17 MG/DL (ref 7–20)
CALCIUM SERPL-MCNC: 8.5 MG/DL (ref 8.3–10.6)
CHLORIDE SERPL-SCNC: 92 MMOL/L (ref 99–110)
CO2 SERPL-SCNC: 20 MMOL/L (ref 21–32)
COHGB MFR BLD: 1.6 % (ref 0–1.5)
CREAT SERPL-MCNC: 0.8 MG/DL (ref 0.6–1.2)
EOSINOPHIL # BLD: 0.01 K/UL (ref 0–0.6)
EOSINOPHILS RELATIVE PERCENT: 0 %
ERYTHROCYTE [DISTWIDTH] IN BLOOD BY AUTOMATED COUNT: 13.1 % (ref 12.4–15.4)
GFR, ESTIMATED: 74 ML/MIN/1.73M2
GLUCOSE SERPL-MCNC: 96 MG/DL (ref 70–99)
HCO3 VENOUS: 19.4 MMOL/L (ref 23–29)
HCT VFR BLD AUTO: 37.2 % (ref 36–48)
HGB BLD-MCNC: 12.9 G/DL (ref 12–16)
IMM GRANULOCYTES # BLD AUTO: 0.11 K/UL (ref 0–0.5)
IMM GRANULOCYTES NFR BLD: 1 %
LYMPHOCYTES NFR BLD: 0.57 K/UL (ref 1–5.1)
LYMPHOCYTES RELATIVE PERCENT: 7 %
MCH RBC QN AUTO: 29.2 PG (ref 26–34)
MCHC RBC AUTO-ENTMCNC: 34.7 G/DL (ref 31–36)
MCV RBC AUTO: 84.2 FL (ref 80–100)
METHEMOGLOBIN: 0.7 % (ref 0–1.4)
MONOCYTES NFR BLD: 0.54 K/UL (ref 0–1.3)
MONOCYTES NFR BLD: 6 %
NEGATIVE BASE EXCESS, VEN: 3.9 MMOL/L (ref 0–3)
NEUTROPHILS NFR BLD: 86 %
NEUTS SEG NFR BLD: 7.38 K/UL (ref 1.7–7.7)
O2 SAT, VEN: 76.5 %
OSMOLALITY UR: 417 MOSM/KG (ref 390–1070)
PCO2 VENOUS: 31 MM HG (ref 40–50)
PH VENOUS: 7.42 (ref 7.35–7.45)
PLATELET # BLD AUTO: 195 K/UL (ref 135–450)
PMV BLD AUTO: 9.1 FL (ref 9.4–12.4)
PO2 VENOUS: 42.5 MM HG
POTASSIUM SERPL-SCNC: 4.3 MMOL/L (ref 3.5–5.1)
POTASSIUM, UR: 40.6 MMOL/L
PROT SERPL-MCNC: 5.7 G/DL (ref 6.4–8.2)
RBC # BLD AUTO: 4.42 M/UL (ref 4–5.2)
SODIUM SERPL-SCNC: 124 MMOL/L (ref 136–145)
SODIUM SERPL-SCNC: 125 MMOL/L (ref 136–145)
SODIUM UR-SCNC: 35 MMOL/L
TSH SERPL DL<=0.05 MIU/L-ACNC: 0.45 UIU/ML (ref 0.27–4.2)
URATE SERPL-MCNC: 3.9 MG/DL (ref 2.6–6)
WBC OTHER # BLD: 8.6 K/UL (ref 4–11)

## 2025-06-11 PROCEDURE — 84295 ASSAY OF SERUM SODIUM: CPT

## 2025-06-11 PROCEDURE — 6360000002 HC RX W HCPCS: Performed by: NURSE PRACTITIONER

## 2025-06-11 PROCEDURE — 2060000000 HC ICU INTERMEDIATE R&B

## 2025-06-11 PROCEDURE — 6370000000 HC RX 637 (ALT 250 FOR IP): Performed by: PHYSICIAN ASSISTANT

## 2025-06-11 PROCEDURE — 84133 ASSAY OF URINE POTASSIUM: CPT

## 2025-06-11 PROCEDURE — 84443 ASSAY THYROID STIM HORMONE: CPT

## 2025-06-11 PROCEDURE — 94761 N-INVAS EAR/PLS OXIMETRY MLT: CPT

## 2025-06-11 PROCEDURE — 80053 COMPREHEN METABOLIC PANEL: CPT

## 2025-06-11 PROCEDURE — 51798 US URINE CAPACITY MEASURE: CPT

## 2025-06-11 PROCEDURE — 85025 COMPLETE CBC W/AUTO DIFF WBC: CPT

## 2025-06-11 PROCEDURE — 2500000003 HC RX 250 WO HCPCS: Performed by: NURSE PRACTITIONER

## 2025-06-11 PROCEDURE — 36415 COLL VENOUS BLD VENIPUNCTURE: CPT

## 2025-06-11 PROCEDURE — 6370000000 HC RX 637 (ALT 250 FOR IP): Performed by: NURSE PRACTITIONER

## 2025-06-11 PROCEDURE — 84550 ASSAY OF BLOOD/URIC ACID: CPT

## 2025-06-11 PROCEDURE — 6370000000 HC RX 637 (ALT 250 FOR IP): Performed by: STUDENT IN AN ORGANIZED HEALTH CARE EDUCATION/TRAINING PROGRAM

## 2025-06-11 PROCEDURE — 84300 ASSAY OF URINE SODIUM: CPT

## 2025-06-11 PROCEDURE — 83935 ASSAY OF URINE OSMOLALITY: CPT

## 2025-06-11 PROCEDURE — 94640 AIRWAY INHALATION TREATMENT: CPT

## 2025-06-11 PROCEDURE — 6370000000 HC RX 637 (ALT 250 FOR IP): Performed by: INTERNAL MEDICINE

## 2025-06-11 PROCEDURE — 82805 BLOOD GASES W/O2 SATURATION: CPT

## 2025-06-11 RX ORDER — FLUTICASONE FUROATE, UMECLIDINIUM BROMIDE AND VILANTEROL TRIFENATATE 200; 62.5; 25 UG/1; UG/1; UG/1
1 POWDER RESPIRATORY (INHALATION) DAILY
COMMUNITY

## 2025-06-11 RX ORDER — POLYETHYLENE GLYCOL 3350 17 G/17G
17 POWDER, FOR SOLUTION ORAL DAILY
Status: DISCONTINUED | OUTPATIENT
Start: 2025-06-12 | End: 2025-06-14 | Stop reason: HOSPADM

## 2025-06-11 RX ORDER — LORAZEPAM 1 MG/1
1 TABLET ORAL ONCE
Status: COMPLETED | OUTPATIENT
Start: 2025-06-11 | End: 2025-06-11

## 2025-06-11 RX ORDER — BISACODYL 10 MG
10 SUPPOSITORY, RECTAL RECTAL DAILY PRN
Status: DISCONTINUED | OUTPATIENT
Start: 2025-06-11 | End: 2025-06-14 | Stop reason: HOSPADM

## 2025-06-11 RX ADMIN — Medication 1 DROP: at 13:57

## 2025-06-11 RX ADMIN — ROFLUMILAST 500 MCG: 500 TABLET ORAL at 09:09

## 2025-06-11 RX ADMIN — LIDOCAINE HYDROCHLORIDE 40 ML: 20 SOLUTION ORAL at 12:14

## 2025-06-11 RX ADMIN — METOPROLOL SUCCINATE 25 MG: 25 TABLET, EXTENDED RELEASE ORAL at 09:08

## 2025-06-11 RX ADMIN — DULOXETINE 60 MG: 30 CAPSULE, DELAYED RELEASE ORAL at 21:38

## 2025-06-11 RX ADMIN — Medication 1 G: at 13:57

## 2025-06-11 RX ADMIN — HYDROCORTISONE 5 MG: 10 TABLET ORAL at 21:38

## 2025-06-11 RX ADMIN — HYDROCORTISONE 15 MG: 10 TABLET ORAL at 09:09

## 2025-06-11 RX ADMIN — MINERAL OIL 1 ENEMA: 100 ENEMA RECTAL at 18:48

## 2025-06-11 RX ADMIN — LOSARTAN POTASSIUM 100 MG: 50 TABLET, FILM COATED ORAL at 09:09

## 2025-06-11 RX ADMIN — ARFORMOTEROL TARTRATE 15 MCG: 15 SOLUTION RESPIRATORY (INHALATION) at 07:33

## 2025-06-11 RX ADMIN — IPRATROPIUM BROMIDE 0.5 MG: 0.5 SOLUTION RESPIRATORY (INHALATION) at 07:33

## 2025-06-11 RX ADMIN — ENOXAPARIN SODIUM 40 MG: 100 INJECTION SUBCUTANEOUS at 16:21

## 2025-06-11 RX ADMIN — POLYETHYLENE GLYCOL 3350 17 G: 17 POWDER, FOR SOLUTION ORAL at 15:22

## 2025-06-11 RX ADMIN — IPRATROPIUM BROMIDE 0.5 MG: 0.5 SOLUTION RESPIRATORY (INHALATION) at 11:22

## 2025-06-11 RX ADMIN — BUDESONIDE 1000 MCG: 0.5 SUSPENSION RESPIRATORY (INHALATION) at 07:33

## 2025-06-11 RX ADMIN — PANTOPRAZOLE SODIUM 40 MG: 40 TABLET, DELAYED RELEASE ORAL at 06:43

## 2025-06-11 RX ADMIN — Medication 10 ML: at 21:38

## 2025-06-11 RX ADMIN — Medication 1 DROP: at 22:03

## 2025-06-11 RX ADMIN — ROSUVASTATIN 20 MG: 20 TABLET, FILM COATED ORAL at 21:38

## 2025-06-11 RX ADMIN — IPRATROPIUM BROMIDE 0.5 MG: 0.5 SOLUTION RESPIRATORY (INHALATION) at 20:35

## 2025-06-11 RX ADMIN — ACETAMINOPHEN 650 MG: 325 TABLET ORAL at 06:43

## 2025-06-11 RX ADMIN — ARFORMOTEROL TARTRATE 15 MCG: 15 SOLUTION RESPIRATORY (INHALATION) at 20:35

## 2025-06-11 RX ADMIN — Medication 1 G: at 18:48

## 2025-06-11 RX ADMIN — IPRATROPIUM BROMIDE 0.5 MG: 0.5 SOLUTION RESPIRATORY (INHALATION) at 15:27

## 2025-06-11 RX ADMIN — ACETAMINOPHEN 650 MG: 325 TABLET ORAL at 22:57

## 2025-06-11 RX ADMIN — Medication 1 DROP: at 09:30

## 2025-06-11 RX ADMIN — BUDESONIDE 1000 MCG: 0.5 SUSPENSION RESPIRATORY (INHALATION) at 20:35

## 2025-06-11 RX ADMIN — Medication 1 G: at 09:09

## 2025-06-11 RX ADMIN — Medication 10 ML: at 09:09

## 2025-06-11 RX ADMIN — LORAZEPAM 1 MG: 1 TABLET ORAL at 22:03

## 2025-06-11 ASSESSMENT — PAIN DESCRIPTION - PAIN TYPE
TYPE: ACUTE PAIN

## 2025-06-11 ASSESSMENT — PAIN DESCRIPTION - LOCATION
LOCATION: ABDOMEN
LOCATION: HEAD
LOCATION: GENERALIZED

## 2025-06-11 ASSESSMENT — PAIN SCALES - GENERAL
PAINLEVEL_OUTOF10: 5
PAINLEVEL_OUTOF10: 3
PAINLEVEL_OUTOF10: 6
PAINLEVEL_OUTOF10: 0
PAINLEVEL_OUTOF10: 6
PAINLEVEL_OUTOF10: 4

## 2025-06-11 ASSESSMENT — PAIN - FUNCTIONAL ASSESSMENT
PAIN_FUNCTIONAL_ASSESSMENT: ACTIVITIES ARE NOT PREVENTED

## 2025-06-11 ASSESSMENT — PAIN DESCRIPTION - DESCRIPTORS
DESCRIPTORS: ACHING
DESCRIPTORS: ACHING;BURNING;DISCOMFORT
DESCRIPTORS: ACHING;DISCOMFORT
DESCRIPTORS: ACHING
DESCRIPTORS: DISCOMFORT;ACHING

## 2025-06-11 ASSESSMENT — PAIN DESCRIPTION - ONSET
ONSET: GRADUAL
ONSET: ON-GOING
ONSET: GRADUAL
ONSET: GRADUAL

## 2025-06-11 ASSESSMENT — PAIN DESCRIPTION - ORIENTATION
ORIENTATION: MID

## 2025-06-11 ASSESSMENT — PAIN DESCRIPTION - FREQUENCY
FREQUENCY: INTERMITTENT

## 2025-06-11 NOTE — PROGRESS NOTES
[facility] Medicine Progress Note  V 5.17      Date of Admission: 6/10/2025    Hospital Day: 2      Chief Admission Complaint: Abdominal pain  Difficulty swallowing  Nausea  Abdominal fullness and constipation    Subjective: Looks well, resting comfortably  Alert and oriented to time and place.      Presenting Admission History:       Abdominal pain with fullness, constipation  Hyponatremia  Asymptomatic bacteriuria      Assessment/Plan:        -Hyponatremia  Hyponatremia is improving, sodium level checked today 125.    - Received GI consult by Dr. Guerra  Plan is to proceed with upper endoscopy with esophageal dilation.  Patient will receive EGD procedure tomorrow  N.p.o. after midnight.    - Constipation  Patient will receive stool softener and enema, constipated for few days.  CT obtained during admission suggestive no acute finding in the abdomen or pelvis.  Symptoms of abdominal fullness in relation to constipation.  Upper abdominal pain and fullness relieved with GI cocktail.      -Asymptomatic bacteriuria  Difficulty initiating urinary stream.  Bladder scan revealed 250 mL UA.  Urine culture results are pending      Ongoing threat to life and/or bodily function without ongoing treatment due to:  none    Consults:      IP CONSULT TO NEPHROLOGY  IP CONSULT TO GI  IP CONSULT TO SOCIAL WORK  IP CONSULT TO DIETITIAN        --------------------------------------------------      Medications:        Infusion Medications    sodium chloride       Scheduled Medications    [START ON 6/12/2025] polyethylene glycol  17 g Oral Daily    sodium chloride  1 g Oral TID WC    DULoxetine  60 mg Oral Nightly    Roflumilast  500 mcg Oral Daily    rosuvastatin  20 mg Oral Nightly    sodium chloride flush  5-40 mL IntraVENous 2 times per day    enoxaparin  40 mg SubCUTAneous Daily    pantoprazole  40 mg Oral QAM AC    carboxymethylcellulose PF  1 drop Both Eyes TID    hydrocortisone  15 mg Oral QAM    hydrocortisone  5 mg Oral

## 2025-06-11 NOTE — RT PROTOCOL NOTE
RT Inhaler-Nebulizer Bronchodilator Protocol Note    There is a bronchodilator order in the chart from a provider indicating to follow the RT Bronchodilator Protocol and there is an “Initiate RT Inhaler-Nebulizer Bronchodilator Protocol” order as well (see protocol at bottom of note).    CXR Findings:  No results found.    The findings from the last RT Protocol Assessment were as follows:   History Pulmonary Disease: Chronic pulmonary disease  Respiratory Pattern: Regular pattern and RR 12-20 bpm  Breath Sounds: Slightly diminished and/or crackles  Cough: Strong, spontaneous, non-productive  Indication for Bronchodilator Therapy: On home bronchodilators  Bronchodilator Assessment Score: 4    Aerosolized bronchodilator medication orders have been revised according to the RT Inhaler-Nebulizer Bronchodilator Protocol below.    Respiratory Therapist to perform RT Therapy Protocol Assessment initially then follow the protocol.  Repeat RT Therapy Protocol Assessment PRN for score 0-3 or on second treatment, BID, and PRN for scores above 3.    No Indications - adjust the frequency to every 6 hours PRN wheezing or bronchospasm, if no treatments needed after 48 hours then discontinue using Per Protocol order mode.     If indication present, adjust the RT bronchodilator orders based on the Bronchodilator Assessment Score as indicated below.  Use Inhaler orders unless patient has one or more of the following: on home nebulizer, not able to hold breath for 10 seconds, is not alert and oriented, cannot activate and use MDI correctly, or respiratory rate 25 breaths per minute or more, then use the equivalent nebulizer order(s) with same Frequency and PRN reasons based on the score.  If a patient is on this medication at home then do not decrease Frequency below that used at home.    0-3 - enter or revise RT bronchodilator order(s) to equivalent RT Bronchodilator order with Frequency of every 4 hours PRN for wheezing or increased  work of breathing using Per Protocol order mode.        4-6 - enter or revise RT Bronchodilator order(s) to two equivalent RT bronchodilator orders with one order with BID Frequency and one order with Frequency of every 4 hours PRN wheezing or increased work of breathing using Per Protocol order mode.        7-10 - enter or revise RT Bronchodilator order(s) to two equivalent RT bronchodilator orders with one order with TID Frequency and one order with Frequency of every 4 hours PRN wheezing or increased work of breathing using Per Protocol order mode.       11-13 - enter or revise RT Bronchodilator order(s) to one equivalent RT bronchodilator order with QID Frequency and an Albuterol order with Frequency of every 4 hours PRN wheezing or increased work of breathing using Per Protocol order mode.      Greater than 13 - enter or revise RT Bronchodilator order(s) to one equivalent RT bronchodilator order with every 4 hours Frequency and an Albuterol order with Frequency of every 2 hours PRN wheezing or increased work of breathing using Per Protocol order mode.     RT to enter RT Home Evaluation for COPD & MDI Assessment order using Per Protocol order mode.    Electronically signed by Altagracia Curry RCP on 6/11/2025 at 7:43 AM

## 2025-06-11 NOTE — CONSULTS
[facility]  Consultation Note    Reason for Consult: Hyponatremia  Requesting Physician:  Dr. DANIEL Orellana    CHIEF COMPLAINT: Abdominal    History obtained from records and patient.    HISTORY OF PRESENT ILLNESS:                Tiffani Sher  is 80 y.o. y.o. female with significant past medical history of adrenal insufficiency, COPD, GERD, hyperlipidemia, hypertension, sleep apnea, tracheomalacia, bladder cancer status post TURBT, follows with  urology, UTI who presents with upper abdominal pain.  Lower p.o. intake.  Episodes of nausea and vomiting.  On presentation sodium was 120 which prompted this consult.  Sodium today at 124.  Sodium was 136 on 5/22/2025.  It was 130 on 12/28/2024.  No new neurologic symptoms.  No regular NSAID use.  No alcohol abuse.  On hydrocortisone as an outpatient.    Past Medical History:     has a past medical history of Adrenal insufficiency, CKD (chronic kidney disease), COPD (chronic obstructive pulmonary disease) (HCC), Depression, GERD (gastroesophageal reflux disease), Hyperlipidemia, Hypertension, Sleep apnea, and Tracheomalacia.   Past Surgical History:     has a past surgical history that includes laminectomy; back surgery; joint replacement (Right); joint replacement (Left); Carpal tunnel release (Bilateral); Spinal fusion; Knee arthroscopy (Right); Upper gastrointestinal endoscopy (N/A, 09/16/2022); Upper gastrointestinal endoscopy (N/A, 09/16/2022); Colonoscopy (N/A, 09/16/2022); bladder tumor excision (2023); Colonoscopy (N/A, 04/17/2024); Upper gastrointestinal endoscopy (N/A, 04/17/2024); Upper gastrointestinal endoscopy (N/A, 04/17/2024); Spinal Cord Stimulator Surgery (11/2024); and Colonoscopy (N/A, 4/30/2025).   Current Medications:    Current Facility-Administered Medications: sodium chloride tablet 1 g, 1 g, Oral, TID WC  albuterol (PROVENTIL) (2.5 MG/3ML) 0.083% nebulizer solution 2.5 mg, 2.5 mg, Nebulization, Q4H PRN  DULoxetine

## 2025-06-11 NOTE — PROGRESS NOTES
Patient found with autopap off. Patient is refusing to wear our machine, stated it was uncomfortable. MD aware. No distress noted.

## 2025-06-11 NOTE — PROGRESS NOTES
Paged Dr. Romero    pt admitted today for chronic abd pain, having EGD tomorrow, requesting her 1mg ativan she takes at bedtime for anxiety, thanks!! Alex MURRELL     New order for one time dose 1mg ativan PO

## 2025-06-11 NOTE — CONSULTS
patent.    PLEURA: No pleural effusion or thickening.    HEART AND GREAT VESSELS: Normal heart size. Unchanged trace pericardial fluid. Coronary artery calcification consistent with coronary artery disease. Moderate calcification of the ascending aorta.  Thoracic aorta and main pulmonary artery are normal in   caliber.    MEDIASTINUM: No lymphadenopathy.    CHEST WALL / LOWER NECK: No significant abnormality.    BONES: No significant abnormality.  Impression: 1. No acute findings in the chest.    ABDOMEN AND PELVIS:    FINDINGS:    LIVER: No acute findings.    GALLBLADDER AND BILIARY TREE: Gallbladder not distended.  No biliary dilatation.    PANCREAS: Normal.    SPLEEN: Normal.    ADRENAL GLANDS: Normal.    KIDNEYS AND URETERS: Normal.    URINARY BLADDER: Normal.    BOWEL: Normal diameter, nonobstructed.  Appendix is normal. Endoscopic clips in the ascending aorta and transverse colon.    REPRODUCTIVE ORGANS: No associated masses.    LYMPH NODES: No abnormally enlarged nodes.    PERITONEUM/RETROPERITONEUM: No ascites or free air.    VESSELS: Aorta is normal in caliber.     ABDOMINAL WALL: Normal.    BONES: No significant abnormality. Lumbar spine fusion hardware is present. Right hip arthroplasty is noted. A thoracic spinal stimulator is present.    IMPRESSION:    1. No acute findings in the abdomen or pelvis.    Electronically signed by Bernardino Stevens MD       Labs:   Recent Labs     06/10/25  1330 06/10/25  2000 06/11/25  0442   WBC 15.7*  --  8.6   HGB 13.9  --  12.9     --  195   ALKPHOS 65  --  54   ALT 22  --  20   AST 15  --  13*   BILITOT 0.6  --  0.5   BUN 22* 20 17   CREATININE 1.1 0.9 0.8   * 121* 124*   K 4.6 4.2 4.3        Assessment:    Hospital Problems           Last Modified POA    * (Principal) Hyponatremia 6/10/2025 Yes    Generalized abdominal pain 6/10/2025 Yes       Dysphagia  Generalized abdominal pain  Chronic constipation  Hyponatremia    Plan:  Generalized abdominal pain is  probably secondary to constipation predominant irritable bowel syndrome.  Will treat with laxatives.  Trial of enteral while in the hospital.  Proceed with upper endoscopy with esophageal dilatation for further evaluation of her upper epigastric/abdominal pain that worsens with food to rule out peptic ulcer disease/peptic esophagitis or strictures.  Procedure, risk, benefits have been discussed.      Charlie Fuentes MD    [facility]    [phone]. Also available via Perfect Serve    Please note that some or all of this record was generated using voice recognition software. If there are any questions about the content of this document, please contact the author as some errors in translation may have occurred.

## 2025-06-11 NOTE — CARE COORDINATION
Discharge Planning Note:    Chart reviewed and it appears that patient has minimal needs for discharge at this time. Risk Score 11 %     Primary Care Physician is Dr. Conner  Primary insurance is MEDICARE PART A AND B     Please notify case management if any discharge needs are identified.      Case management will continue to follow progress and update discharge plan as needed.     Pt from home with sister. Pt IPTA. Plan is home at d/c. Pt lives in a two-story condo. Pt has chair lift, rollator, CPAP and raised toilet seat. Pt refusing HHC or SNF. Pt would like a referral to [facility] for help with placing her sister in [place] memmory care. Referred to [facility] spoke with Gabby.

## 2025-06-11 NOTE — PROGRESS NOTES
Post void residual bladder scan showed 428ml after voiding 200 ml, pt straight cathed, 200 ml yellow urine obtained

## 2025-06-12 ENCOUNTER — ANESTHESIA (OUTPATIENT)
Age: 80
End: 2025-06-12
Payer: MEDICARE

## 2025-06-12 ENCOUNTER — ANESTHESIA EVENT (OUTPATIENT)
Age: 80
End: 2025-06-12
Payer: MEDICARE

## 2025-06-12 LAB
ALBUMIN SERPL-MCNC: 4 G/DL (ref 3.4–5)
ALBUMIN/GLOB SERPL: 1.9 {RATIO}
ALP SERPL-CCNC: 62 U/L (ref 40–129)
ALT SERPL-CCNC: 21 U/L (ref 10–40)
ANION GAP SERPL CALCULATED.3IONS-SCNC: 10 MMOL/L (ref 3–16)
AST SERPL-CCNC: 15 U/L (ref 15–37)
BASOPHILS # BLD: 0 K/UL (ref 0–0.2)
BASOPHILS NFR BLD: 0 %
BILIRUB SERPL-MCNC: 0.4 MG/DL (ref 0–1)
BUN SERPL-MCNC: 19 MG/DL (ref 7–20)
CALCIUM SERPL-MCNC: 8.9 MG/DL (ref 8.3–10.6)
CHLORIDE SERPL-SCNC: 95 MMOL/L (ref 99–110)
CO2 SERPL-SCNC: 22 MMOL/L (ref 21–32)
CREAT SERPL-MCNC: 0.9 MG/DL (ref 0.6–1.2)
EOSINOPHIL # BLD: 0.02 K/UL (ref 0–0.6)
EOSINOPHILS RELATIVE PERCENT: 0 %
ERYTHROCYTE [DISTWIDTH] IN BLOOD BY AUTOMATED COUNT: 13.2 % (ref 12.4–15.4)
GFR, ESTIMATED: 67 ML/MIN/1.73M2
GLUCOSE SERPL-MCNC: 112 MG/DL (ref 70–99)
HCT VFR BLD AUTO: 38.9 % (ref 36–48)
HGB BLD-MCNC: 13.4 G/DL (ref 12–16)
IMM GRANULOCYTES # BLD AUTO: 0.08 K/UL (ref 0–0.5)
IMM GRANULOCYTES NFR BLD: 1 %
LYMPHOCYTES NFR BLD: 0.65 K/UL (ref 1–5.1)
LYMPHOCYTES RELATIVE PERCENT: 6 %
MAGNESIUM SERPL-MCNC: 2.3 MG/DL (ref 1.8–2.4)
MCH RBC QN AUTO: 29.4 PG (ref 26–34)
MCHC RBC AUTO-ENTMCNC: 34.4 G/DL (ref 31–36)
MCV RBC AUTO: 85.3 FL (ref 80–100)
MICROORGANISM SPEC CULT: ABNORMAL
MONOCYTES NFR BLD: 0.54 K/UL (ref 0–1.3)
MONOCYTES NFR BLD: 5 %
NEUTROPHILS NFR BLD: 88 %
NEUTS SEG NFR BLD: 9.17 K/UL (ref 1.7–7.7)
PLATELET # BLD AUTO: 213 K/UL (ref 135–450)
PMV BLD AUTO: 9.3 FL (ref 9.4–12.4)
POTASSIUM SERPL-SCNC: 4.6 MMOL/L (ref 3.5–5.1)
PROT SERPL-MCNC: 6.1 G/DL (ref 6.4–8.2)
RBC # BLD AUTO: 4.56 M/UL (ref 4–5.2)
SODIUM SERPL-SCNC: 127 MMOL/L (ref 136–145)
SPECIMEN DESCRIPTION: ABNORMAL
WBC OTHER # BLD: 10.5 K/UL (ref 4–11)

## 2025-06-12 PROCEDURE — 51798 US URINE CAPACITY MEASURE: CPT

## 2025-06-12 PROCEDURE — 2580000003 HC RX 258: Performed by: NURSE PRACTITIONER

## 2025-06-12 PROCEDURE — 85025 COMPLETE CBC W/AUTO DIFF WBC: CPT

## 2025-06-12 PROCEDURE — 88305 TISSUE EXAM BY PATHOLOGIST: CPT

## 2025-06-12 PROCEDURE — 2500000003 HC RX 250 WO HCPCS: Performed by: NURSE PRACTITIONER

## 2025-06-12 PROCEDURE — 3609012400 HC EGD TRANSORAL BIOPSY SINGLE/MULTIPLE: Performed by: INTERNAL MEDICINE

## 2025-06-12 PROCEDURE — 94640 AIRWAY INHALATION TREATMENT: CPT

## 2025-06-12 PROCEDURE — 6360000002 HC RX W HCPCS: Performed by: NURSE PRACTITIONER

## 2025-06-12 PROCEDURE — 0D758ZZ DILATION OF ESOPHAGUS, VIA NATURAL OR ARTIFICIAL OPENING ENDOSCOPIC: ICD-10-PCS | Performed by: INTERNAL MEDICINE

## 2025-06-12 PROCEDURE — 3700000000 HC ANESTHESIA ATTENDED CARE: Performed by: INTERNAL MEDICINE

## 2025-06-12 PROCEDURE — 2580000003 HC RX 258

## 2025-06-12 PROCEDURE — 7100000010 HC PHASE II RECOVERY - FIRST 15 MIN: Performed by: INTERNAL MEDICINE

## 2025-06-12 PROCEDURE — 80053 COMPREHEN METABOLIC PANEL: CPT

## 2025-06-12 PROCEDURE — 6360000002 HC RX W HCPCS

## 2025-06-12 PROCEDURE — 2060000000 HC ICU INTERMEDIATE R&B

## 2025-06-12 PROCEDURE — 0DB58ZX EXCISION OF ESOPHAGUS, VIA NATURAL OR ARTIFICIAL OPENING ENDOSCOPIC, DIAGNOSTIC: ICD-10-PCS | Performed by: INTERNAL MEDICINE

## 2025-06-12 PROCEDURE — 6370000000 HC RX 637 (ALT 250 FOR IP): Performed by: NURSE PRACTITIONER

## 2025-06-12 PROCEDURE — 36415 COLL VENOUS BLD VENIPUNCTURE: CPT

## 2025-06-12 PROCEDURE — 7100000011 HC PHASE II RECOVERY - ADDTL 15 MIN: Performed by: INTERNAL MEDICINE

## 2025-06-12 PROCEDURE — 3700000001 HC ADD 15 MINUTES (ANESTHESIA): Performed by: INTERNAL MEDICINE

## 2025-06-12 PROCEDURE — 83735 ASSAY OF MAGNESIUM: CPT

## 2025-06-12 PROCEDURE — 2709999900 HC NON-CHARGEABLE SUPPLY: Performed by: INTERNAL MEDICINE

## 2025-06-12 PROCEDURE — 6370000000 HC RX 637 (ALT 250 FOR IP): Performed by: INTERNAL MEDICINE

## 2025-06-12 PROCEDURE — 3609015300 HC ESOPHAGEAL DILATION MALONEY: Performed by: INTERNAL MEDICINE

## 2025-06-12 RX ORDER — AMLODIPINE BESYLATE 5 MG/1
5 TABLET ORAL DAILY
Status: DISCONTINUED | OUTPATIENT
Start: 2025-06-12 | End: 2025-06-14 | Stop reason: HOSPADM

## 2025-06-12 RX ORDER — PROPOFOL 10 MG/ML
INJECTION, EMULSION INTRAVENOUS
Status: DISCONTINUED | OUTPATIENT
Start: 2025-06-12 | End: 2025-06-12 | Stop reason: SDUPTHER

## 2025-06-12 RX ORDER — TAMSULOSIN HYDROCHLORIDE 0.4 MG/1
0.4 CAPSULE ORAL DAILY
Status: DISCONTINUED | OUTPATIENT
Start: 2025-06-12 | End: 2025-06-14 | Stop reason: HOSPADM

## 2025-06-12 RX ORDER — SODIUM CHLORIDE 0.9 % (FLUSH) 0.9 %
5-40 SYRINGE (ML) INJECTION PRN
Status: CANCELLED | OUTPATIENT
Start: 2025-06-12

## 2025-06-12 RX ORDER — AMLODIPINE BESYLATE 5 MG/1
5 TABLET ORAL DAILY
Status: DISCONTINUED | OUTPATIENT
Start: 2025-06-13 | End: 2025-06-12

## 2025-06-12 RX ORDER — SODIUM CHLORIDE 9 MG/ML
INJECTION, SOLUTION INTRAVENOUS PRN
Status: CANCELLED | OUTPATIENT
Start: 2025-06-12

## 2025-06-12 RX ORDER — NALOXONE HYDROCHLORIDE 0.4 MG/ML
INJECTION, SOLUTION INTRAMUSCULAR; INTRAVENOUS; SUBCUTANEOUS PRN
Status: CANCELLED | OUTPATIENT
Start: 2025-06-12

## 2025-06-12 RX ORDER — DIPHENHYDRAMINE HYDROCHLORIDE 50 MG/ML
12.5 INJECTION, SOLUTION INTRAMUSCULAR; INTRAVENOUS
Status: CANCELLED | OUTPATIENT
Start: 2025-06-12

## 2025-06-12 RX ORDER — FENTANYL CITRATE 50 UG/ML
INJECTION, SOLUTION INTRAMUSCULAR; INTRAVENOUS
Status: DISCONTINUED | OUTPATIENT
Start: 2025-06-12 | End: 2025-06-12 | Stop reason: SDUPTHER

## 2025-06-12 RX ORDER — ONDANSETRON 2 MG/ML
4 INJECTION INTRAMUSCULAR; INTRAVENOUS
Status: CANCELLED | OUTPATIENT
Start: 2025-06-12

## 2025-06-12 RX ORDER — GLYCOPYRROLATE 0.2 MG/ML
INJECTION INTRAMUSCULAR; INTRAVENOUS
Status: DISCONTINUED | OUTPATIENT
Start: 2025-06-12 | End: 2025-06-12 | Stop reason: SDUPTHER

## 2025-06-12 RX ORDER — SODIUM CHLORIDE 9 MG/ML
INJECTION, SOLUTION INTRAVENOUS
Status: DISCONTINUED | OUTPATIENT
Start: 2025-06-12 | End: 2025-06-12 | Stop reason: SDUPTHER

## 2025-06-12 RX ORDER — LIDOCAINE HYDROCHLORIDE 20 MG/ML
INJECTION, SOLUTION INFILTRATION; PERINEURAL
Status: DISCONTINUED | OUTPATIENT
Start: 2025-06-12 | End: 2025-06-12 | Stop reason: SDUPTHER

## 2025-06-12 RX ORDER — FUROSEMIDE 20 MG/1
20 TABLET ORAL 2 TIMES DAILY
Status: DISCONTINUED | OUTPATIENT
Start: 2025-06-12 | End: 2025-06-14 | Stop reason: HOSPADM

## 2025-06-12 RX ORDER — LORAZEPAM 1 MG/1
1 TABLET ORAL ONCE
Status: COMPLETED | OUTPATIENT
Start: 2025-06-12 | End: 2025-06-12

## 2025-06-12 RX ORDER — SODIUM CHLORIDE 0.9 % (FLUSH) 0.9 %
5-40 SYRINGE (ML) INJECTION EVERY 12 HOURS SCHEDULED
Status: CANCELLED | OUTPATIENT
Start: 2025-06-12

## 2025-06-12 RX ADMIN — DULOXETINE 60 MG: 30 CAPSULE, DELAYED RELEASE ORAL at 20:17

## 2025-06-12 RX ADMIN — PROPOFOL 50 MG: 10 INJECTION, EMULSION INTRAVENOUS at 08:11

## 2025-06-12 RX ADMIN — HYDROCORTISONE 5 MG: 10 TABLET ORAL at 20:17

## 2025-06-12 RX ADMIN — Medication 10 ML: at 20:17

## 2025-06-12 RX ADMIN — PANTOPRAZOLE SODIUM 40 MG: 40 TABLET, DELAYED RELEASE ORAL at 06:31

## 2025-06-12 RX ADMIN — HYDROCORTISONE 15 MG: 10 TABLET ORAL at 11:36

## 2025-06-12 RX ADMIN — FENTANYL CITRATE 50 MCG: 50 INJECTION, SOLUTION INTRAMUSCULAR; INTRAVENOUS at 08:10

## 2025-06-12 RX ADMIN — MEROPENEM 1000 MG: 1 INJECTION INTRAVENOUS at 20:18

## 2025-06-12 RX ADMIN — ROFLUMILAST 500 MCG: 500 TABLET ORAL at 11:23

## 2025-06-12 RX ADMIN — Medication 1 G: at 17:25

## 2025-06-12 RX ADMIN — LIDOCAINE HYDROCHLORIDE 60 MG: 20 INJECTION, SOLUTION EPIDURAL; INFILTRATION; INTRACAUDAL; PERINEURAL at 08:11

## 2025-06-12 RX ADMIN — IPRATROPIUM BROMIDE 0.5 MG: 0.5 SOLUTION RESPIRATORY (INHALATION) at 11:33

## 2025-06-12 RX ADMIN — FENTANYL CITRATE 50 MCG: 50 INJECTION, SOLUTION INTRAMUSCULAR; INTRAVENOUS at 08:06

## 2025-06-12 RX ADMIN — FUROSEMIDE 20 MG: 20 TABLET ORAL at 21:43

## 2025-06-12 RX ADMIN — LIDOCAINE HYDROCHLORIDE 40 ML: 20 SOLUTION ORAL at 14:19

## 2025-06-12 RX ADMIN — Medication 1 G: at 11:26

## 2025-06-12 RX ADMIN — METOPROLOL SUCCINATE 25 MG: 25 TABLET, EXTENDED RELEASE ORAL at 11:24

## 2025-06-12 RX ADMIN — TAMSULOSIN HYDROCHLORIDE 0.4 MG: 0.4 CAPSULE ORAL at 15:58

## 2025-06-12 RX ADMIN — Medication 10 ML: at 17:24

## 2025-06-12 RX ADMIN — Medication 10 ML: at 16:06

## 2025-06-12 RX ADMIN — MEROPENEM 1000 MG: 1 INJECTION INTRAVENOUS at 14:32

## 2025-06-12 RX ADMIN — Medication 1 DROP: at 15:58

## 2025-06-12 RX ADMIN — LOSARTAN POTASSIUM 100 MG: 50 TABLET, FILM COATED ORAL at 11:24

## 2025-06-12 RX ADMIN — ROSUVASTATIN 20 MG: 20 TABLET, FILM COATED ORAL at 20:17

## 2025-06-12 RX ADMIN — ARFORMOTEROL TARTRATE 15 MCG: 15 SOLUTION RESPIRATORY (INHALATION) at 20:31

## 2025-06-12 RX ADMIN — BUDESONIDE 1000 MCG: 0.5 SUSPENSION RESPIRATORY (INHALATION) at 20:31

## 2025-06-12 RX ADMIN — IPRATROPIUM BROMIDE 0.5 MG: 0.5 SOLUTION RESPIRATORY (INHALATION) at 20:31

## 2025-06-12 RX ADMIN — PROPOFOL 150 MCG/KG/MIN: 10 INJECTION, EMULSION INTRAVENOUS at 08:12

## 2025-06-12 RX ADMIN — GLYCOPYRROLATE 0.1 MG: 0.2 INJECTION INTRAMUSCULAR; INTRAVENOUS at 08:11

## 2025-06-12 RX ADMIN — Medication 1 DROP: at 20:26

## 2025-06-12 RX ADMIN — Medication 1 G: at 15:58

## 2025-06-12 RX ADMIN — AMLODIPINE BESYLATE 5 MG: 5 TABLET ORAL at 21:43

## 2025-06-12 RX ADMIN — SODIUM CHLORIDE: 9 INJECTION, SOLUTION INTRAVENOUS at 08:02

## 2025-06-12 RX ADMIN — LORAZEPAM 1 MG: 1 TABLET ORAL at 14:00

## 2025-06-12 ASSESSMENT — PAIN DESCRIPTION - ORIENTATION: ORIENTATION: MID

## 2025-06-12 ASSESSMENT — ENCOUNTER SYMPTOMS
SHORTNESS OF BREATH: 0
ABDOMINAL PAIN: 0
VOMITING: 0
CHEST TIGHTNESS: 0
ABDOMINAL DISTENTION: 1
CONSTIPATION: 0
PHOTOPHOBIA: 0
COUGH: 0
BLOOD IN STOOL: 0
FACIAL SWELLING: 0
EYE REDNESS: 0
DIARRHEA: 0
NAUSEA: 0
EYE DISCHARGE: 0
SHORTNESS OF BREATH: 0

## 2025-06-12 ASSESSMENT — PAIN DESCRIPTION - LOCATION: LOCATION: ABDOMEN

## 2025-06-12 ASSESSMENT — PAIN SCALES - GENERAL
PAINLEVEL_OUTOF10: 0
PAINLEVEL_OUTOF10: 6
PAINLEVEL_OUTOF10: 0

## 2025-06-12 ASSESSMENT — PAIN - FUNCTIONAL ASSESSMENT
PAIN_FUNCTIONAL_ASSESSMENT: ACTIVITIES ARE NOT PREVENTED
PAIN_FUNCTIONAL_ASSESSMENT: NONE - DENIES PAIN

## 2025-06-12 ASSESSMENT — PAIN DESCRIPTION - DESCRIPTORS: DESCRIPTORS: ACHING;DISCOMFORT

## 2025-06-12 ASSESSMENT — COPD QUESTIONNAIRES: CAT_SEVERITY: SEVERE

## 2025-06-12 ASSESSMENT — PAIN DESCRIPTION - PAIN TYPE: TYPE: ACUTE PAIN

## 2025-06-12 NOTE — PROGRESS NOTES
[facility]    Pharmacy Renal Dosing and Extended Infusion Beta-Lactam Adjustment Communication    Meropenem ordered for treatment of UTI (hx MDRO). Per [facility] Renal Dose Adjustment Policy and Extended Infusion Beta-Lactam Policy, meropenem will be changed to 1000 mg IV x1 for 30 minutes, followed by meropenem 1000 mg IV every 8 hours EI.     Estimated Creatinine Clearance: Estimated Creatinine Clearance: 50 mL/min (based on SCr of 0.9 mg/dL).  Dialysis Status, [facility], CKD: None  BMI: Body mass index is 31.83 kg/m².    Rationale for Adjustment: Agent is renally eliminated and demonstrates time-dependent effect on bacterial eradication. Extended-infusion dosing strategy aims to enhance microbiologic and clinical efficacy.    Pharmacy will continue to monitor renal function, cultures and sensitivities (where available) and adjust dose as necessary.      Please call with any questions.    Jere Hays, [place]  [facility]   [id number]  6/12/2025   12:02 PM

## 2025-06-12 NOTE — ANESTHESIA PRE PROCEDURE
06/12/2025 06:45 AM    ALKPHOS 62 06/12/2025 06:45 AM    AST 15 06/12/2025 06:45 AM    ALT 21 06/12/2025 06:45 AM       POC Tests: No results for input(s): \"POCGLU\", \"POCNA\", \"POCK\", \"POCCL\", \"POCBUN\", \"POCHEMO\", \"POCHCT\" in the last 72 hours.    Coags: No results found for: \"PROTIME\", \"INR\", \"APTT\"    HCG (If Applicable): No results found for: \"PREGTESTUR\", \"PREGSERUM\", \"HCG\", \"HCGQUANT\"     ABGs: No results found for: \"PHART\", \"PO2ART\", \"SVN3FDT\", \"PJN1WAT\", \"BEART\", \"V4VKWYQY\"     Type & Screen (If Applicable):  No results found for: \"LABABO\"    Drug/Infectious Status (If Applicable):  No results found for: \"HIV\", \"HEPCAB\"    COVID-19 Screening (If Applicable): No results found for: \"COVID19\"        Anesthesia Evaluation  Patient summary reviewed  Airway: Mallampati: II  TM distance: >3 FB   Neck ROM: full  Mouth opening: > = 3 FB   Dental: normal exam         Pulmonary:normal exam  breath sounds clear to auscultation  (+)  COPD: severe,    sleep apnea: on CPAP,           (-) shortness of breath                          ROS comment: Has tracheomalacia    Pulmonary Function Test   - FEV1 (L) 1.14 L 1.04 L 1.11 L 0.91 L   - FEV1 % 56 % 52 % 57 % 49 %   - FVC (L) 1.85 L 1.68 L 1.78 L 1.52 L   - FVC % 68 % 63 % 69 % 63 %   - FEV1/FVC % 62 % 62 % 62 % 60 %   - FEV1/FVC EXP 76 75 65 65   - FEV1 after Bronchodilator 1.29 1.12 0.99   - FVC after Bronchodilator 2.11 1.71 1.54   - % Response to Bronchodilator FEV1 13 1 4   - % Response to Bronchodilator FVC 14 -3 0   - TLC (L) Pleth 4.57 L 4.73 L 5.22 L   - TLC % Pleth 97 % 102 % 114 %   - RV (L) Pleth 2.57 L 3.03 L 3.71 L   - RV % Pleth 129 % 147 % 188 %   - TLC (L) N2 4.52 L   - TLC % N2 97 %   - RV (L) N2 2.71 L   - RV % N2 134 %   - DLCO (mL/min/mmHg) 11.47 mL/mmHg/min 11.43 mL/mmHg/min 11.07 mL/mmHg/min 8.56 mL/mmHg/min   - DLCO % 55 % 55 % 62 % 48 %   - DLCO HB (mL/min/mmHg) 11.47 mL/mmHg/min 11.32 mL/mmHg/min 10.97 mL/mmHg/min 8.79 mL/mmHg/min   - DLCO HB % 55 % 55

## 2025-06-12 NOTE — PROGRESS NOTES
06/11/25 1515   Encounter Summary   Encounter Overview/Reason Attempted Encounter   Service Provided For Patient not available   Referral/Consult From Rounding   Last Encounter  06/11/25  (Patient was not in their room/CK)

## 2025-06-12 NOTE — PROGRESS NOTES
[facility] has verified that procedural consent is correctly filled out prior to the start of the procedure.

## 2025-06-12 NOTE — PROGRESS NOTES
Prep for Gastric Emptying test 6/13-NPO 6 hours prior / no narcotics, opiates or stomach mortility medications for 24 hours prior. Test will start approx 7:30 AM Test length is 4 hours long

## 2025-06-12 NOTE — PROGRESS NOTES
RT attempted to administer scheduled breathing treatments. Patient is off unit at this time in Endo for a procedure. Will attempt again when patient returns to the floor.

## 2025-06-12 NOTE — H&P
[facility] ENDOSCOPY  Outpatient Procedure H&P    Patient: Tiffani Sher MRN: [id number]     YOB: 1945  Age: 80 y.o.  Sex: female    Unit: [facility] ENDOSCOPY Room/Bed: Endo Pool/NONE Location: [facility]     Procedure: Procedure(s):  ESOPHAGOGASTRODUODENOSCOPY WITH DILATION    Indication:Dysphagia  Referring  Physician:        Brief history: GERD    Nurses past medical history notes reviewed and agreed.   Medications reviewed.    Allergies: Codeine, Hydrocodone-acetaminophen, Oxycodone-acetaminophen, Penicillins, Tramadol, Morphine, Fentanyl, Other/food, and Sulfa antibiotics     Allergies noted: Yes     Past Medical History:   Past Medical History:   Diagnosis Date    Adrenal insufficiency     CKD (chronic kidney disease)     COPD (chronic obstructive pulmonary disease) (HCC)     Depression     GERD (gastroesophageal reflux disease)     Hyperlipidemia     Hypertension     Sleep apnea     CPAP at night    Tracheomalacia        Past Surgical History:   Past Surgical History:   Procedure Laterality Date    BACK SURGERY      BLADDER TUMOR EXCISION  2023    CARPAL TUNNEL RELEASE Bilateral     Twice    COLONOSCOPY N/A 09/16/2022    COLONOSCOPY POLYPECTOMY SNARE/COLD BIOPSY performed by Nestor Fernandes MD at [facility] ENDOSCOPY    COLONOSCOPY N/A 04/17/2024    COLONOSCOPY BIOPSY performed by Nestor Fernandes MD at [facility] ENDOSCOPY    COLONOSCOPY N/A 4/30/2025    COLONOSCOPY POLYPECTOMY SNARE/BIOPSY performed by Nestor Fernandes MD at [facility] ENDOSCOPY    JOINT REPLACEMENT Right     Hip    JOINT REPLACEMENT Left     Knee    KNEE ARTHROSCOPY Right     LAMINECTOMY      SPINAL CORD STIMULATOR SURGERY  11/2024    SPINAL FUSION      cervical    UPPER GASTROINTESTINAL ENDOSCOPY N/A 09/16/2022    EGD BIOPSY performed by Nestor Fernandes MD at [facility] ENDOSCOPY    UPPER GASTROINTESTINAL ENDOSCOPY N/A 09/16/2022    EGD CONTROL HEMORRHAGE performed by Nestor Fernandes MD at [facility] ENDOSCOPY    UPPER GASTROINTESTINAL ENDOSCOPY N/A 04/17/2024     Final    Est, Glom Filt Rate 06/11/2025 74  >60 mL/min/1.73m2 Final    Calcium 06/11/2025 8.5  8.3 - 10.6 mg/dL Final    Total Protein 06/11/2025 5.7 (L)  6.4 - 8.2 g/dL Final    Albumin 06/11/2025 3.8  3.4 - 5.0 g/dL Final    Albumin/Globulin Ratio 06/11/2025 1.9   Final    Total Bilirubin 06/11/2025 0.5  0.0 - 1.0 mg/dL Final    Alkaline Phosphatase 06/11/2025 54  40 - 129 U/L Final    ALT 06/11/2025 20  10 - 40 U/L Final    AST 06/11/2025 13 (L)  15 - 37 U/L Final    pH, [facility] 06/11/2025 7.415  7.350 - 7.450 Final    pCO2, [facility] 06/11/2025 31.0 (L)  40.0 - 50.0 mm Hg Final    PO2, [facility] 06/11/2025 42.5  mm Hg Final    HCO3, Venous 06/11/2025 19.4 (L)  23.0 - 29.0 mmol/L Final    Negative Base Excess, [facility] 06/11/2025 3.9 (H)  0.0 - 3.0 mmol/L Final    O2 Sat, [facility] 06/11/2025 76.5  % Final    Carboxyhemoglobin 06/11/2025 1.6 (H)  0.0 - 1.5 % Final    Methemoglobin 06/11/2025 0.7  0.0 - 1.4 % Final    Sodium 06/11/2025 125 (L)  136 - 145 mmol/L Final    WBC 06/12/2025 10.5  4.0 - 11.0 k/uL Final    RBC 06/12/2025 4.56  4.00 - 5.20 m/uL Final    Hemoglobin 06/12/2025 13.4  12.0 - 16.0 g/dL Final    Hematocrit 06/12/2025 38.9  36.0 - 48.0 % Final    MCV 06/12/2025 85.3  80.0 - 100.0 fL Final    MCH 06/12/2025 29.4  26.0 - 34.0 pg Final    MCHC 06/12/2025 34.4  31.0 - 36.0 g/dL Final    RDW 06/12/2025 13.2  12.4 - 15.4 % Final    Platelets 06/12/2025 213  135 - 450 k/uL Final    MPV 06/12/2025 9.3 (L)  9.4 - 12.4 fL Final    Neutrophils % 06/12/2025 88  % Final    Lymphocytes % 06/12/2025 6  % Final    Monocytes % 06/12/2025 5  % Final    Eosinophils % 06/12/2025 0  % Final    Basophils % 06/12/2025 0  % Final    Immature Granulocytes % 06/12/2025 1 (H)  0 % Final    Neutrophils Absolute 06/12/2025 9.17 (H)  1.70 - 7.70 k/uL Final    Lymphocytes Absolute 06/12/2025 0.65 (L)  1.00 - 5.10 k/uL Final    Monocytes Absolute 06/12/2025 0.54  0.00 - 1.30 k/uL Final    Eosinophils Absolute 06/12/2025 0.02  0.00 - 0.60 k/uL Final

## 2025-06-12 NOTE — PROGRESS NOTES
Pt arrived to pre procedure room 1. A+Ox4. Consents reviewed/IV assessed.    Pt educated on safety precautions and use of call light.

## 2025-06-12 NOTE — PLAN OF CARE
Problem: Discharge Planning  Goal: Discharge to home or other facility with appropriate resources  Outcome: Progressing  Flowsheets (Taken 6/12/2025 0838 by Reyna Neal, RN)  Discharge to home or other facility with appropriate resources:   Identify barriers to discharge with patient and caregiver   Arrange for needed discharge resources and transportation as appropriate   Identify discharge learning needs (meds, wound care, etc)     Problem: Safety - Adult  Goal: Free from fall injury  Outcome: Progressing     Problem: Pain  Goal: Verbalizes/displays adequate comfort level or baseline comfort level  Outcome: Progressing  Flowsheets (Taken 6/12/2025 0853 by Reyna Neal, RN)  Verbalizes/displays adequate comfort level or baseline comfort level: Assess pain using appropriate pain scale

## 2025-06-12 NOTE — PLAN OF CARE
Problem: Discharge Planning  Goal: Discharge to home or other facility with appropriate resources  6/11/2025 2225 by Lillian Ayon RN  Outcome: Progressing  6/11/2025 1649 by Kirti Burnett RN  Outcome: Progressing     Problem: Safety - Adult  Goal: Free from fall injury  6/11/2025 2225 by Lillian Ayon RN  Outcome: Progressing  Flowsheets (Taken 6/11/2025 2000)  Free From Fall Injury: Instruct family/caregiver on patient safety  6/11/2025 1649 by Kirti Burnett RN  Outcome: Progressing     Problem: Pain  Goal: Verbalizes/displays adequate comfort level or baseline comfort level  6/11/2025 2225 by Lillian Ayon RN  Outcome: Progressing  6/11/2025 1649 by Kirti Burnett RN  Outcome: Progressing

## 2025-06-12 NOTE — PROGRESS NOTES
Pt had me pull our cpap machine out of room because she says she will not wear it and doesn't want to be charged for it. Pt remains on RA 99% SPO2.

## 2025-06-12 NOTE — PROGRESS NOTES
Hospitalist Progress Note      Name:  Tiffani Sher /Age/Sex: 1945  (80 y.o. female)   MRN & CSN:  [id number] & [id number] Encounter Date/Time: 2025 11:40 AM EDT    Location:  3215/3215-01 PCP: Not, On File (Inactive)       Hospital Day: 3         Date of Admission: 6/10/2025    Chief Complaint:    Chief Complaint   Patient presents with    Abdominal Pain     Patient complains of mid abdominal pain for several months now. Patient reports increased pain this morning. Patient denies n/v/d.         Hospital Course: Tiffani Sher is a 80 y.o.  female, with past medical history significant for adrenal insufficiency, CKD, COPD, depression, GERD, hyperlipidemia, history of esophageal dilation   Sleep apnea Tracheomalacia, hypertension, who presented to the emergency room with complaint of upper abdominal pain.   The present condition started over the past several months.  Patient states she has had generalized abdominal pain more in the upper epigastric region worse after eating and sometimes improves with rest or heating pad.  She reports decreased appetite and poor oral intake.  States she also feels like she is having trouble swallowing and that liquids come right back up.  She denies any nausea, diarrhea.  States she is also been having difficulty initiating urine flow.  History of bladder cancer and follows with  urology. she has followed [facility] Dr. Fernandes in the past.  She had an EGD with dilation in 2024.  Patient recently had colonoscopy 2025, she reports she has an EGD scheduled this month.  Patient states symptoms have worsened and states she drinks liquids and they come right back up.  She also endorses having a poor appetite  She was admitted for further evaluation and treatment.  During hospitalization patient was evaluated by gastroenterology.    Subjective: Patient seen and examined at bedside after her EGD today.  Patient did have esophageal dilation.  Patient does  hours.  No results for input(s): \"CKTOTAL\", \"TROPONINI\" in the last 72 hours.    Urinalysis:      Lab Results   Component Value Date/Time    NITRU NEGATIVE 06/10/2025 02:10 PM    WBCUA 10 TO 20 06/10/2025 02:10 PM    BACTERIA 1+ 06/10/2025 02:10 PM    RBCUA 0 TO 2 06/10/2025 02:10 PM    GLUCOSEU NEGATIVE 06/10/2025 02:10 PM       Radiology:  CT CHEST ABDOMEN PELVIS WO CONTRAST Additional Contrast? None   Final Result      1. No acute findings in the chest.         ABDOMEN AND PELVIS:      FINDINGS:      LIVER: No acute findings.      GALLBLADDER AND BILIARY TREE: Gallbladder not distended.  No biliary dilatation.      PANCREAS: Normal.      SPLEEN: Normal.      ADRENAL GLANDS: Normal.      KIDNEYS AND URETERS: Normal.      URINARY BLADDER: Normal.      BOWEL: Normal diameter, nonobstructed.  Appendix is normal. Endoscopic clips in the ascending aorta and transverse colon.      REPRODUCTIVE ORGANS: No associated masses.      LYMPH NODES: No abnormally enlarged nodes.      PERITONEUM/RETROPERITONEUM: No ascites or free air.      VESSELS: Aorta is normal in caliber.       ABDOMINAL WALL: Normal.      BONES: No significant abnormality. Lumbar spine fusion hardware is present. Right hip arthroplasty is noted. A thoracic spinal stimulator is present.      IMPRESSION:      1. No acute findings in the abdomen or pelvis.      Electronically signed by MD ANGIE Cannon GASTRIC EMPTYING    (Results Pending)             ANGEL Arango - CNP      NOTE:  This report was transcribed using voice recognition software.  Every effort was made to ensure accuracy; however, inadvertent computerized transcription errors may be present.

## 2025-06-12 NOTE — ANESTHESIA POSTPROCEDURE EVALUATION
Department of Anesthesiology  Postprocedure Note    Patient: Tiffani Sher  MRN: [id number]  YOB: 1945  Date of evaluation: 6/12/2025    Procedure Summary       Date: 06/12/25 Room / Location: [place] [address] / [facility]    Anesthesia Start: 0802 Anesthesia Stop: 0822    Procedures:       ESOPHAGEAL DILATION MCPHERSON      ESOPHAGOGASTRODUODENOSCOPY BIOPSY Diagnosis:       Dysphagia, unspecified type      (Dysphagia, unspecified type [R13.10])    Surgeons: Wisam Ramon DO Responsible Provider: Colten Murray MD    Anesthesia Type: MAC ASA Status: 3            Anesthesia Type: MAC    Michel Phase I: Michel Score: 10    Michel Phase II: Michel Score: 10    Anesthesia Post Evaluation    Patient location during evaluation: bedside  Patient participation: complete - patient participated  Level of consciousness: awake and alert  Pain score: 0  Nausea & Vomiting: no nausea  Cardiovascular status: hemodynamically stable  Respiratory status: acceptable  Hydration status: stable  Pain management: adequate    There were no known notable events for this encounter.

## 2025-06-12 NOTE — PROGRESS NOTES
Pt arrived to preop room # 1 from endo. Report received from Endo RN and CRNA. Pt resting quietly in bed, respirations even and unlabored. Attached to Preop monitoring system. Alarms and parameters set. Will continue to monitor pt closely. Call light in reach.

## 2025-06-12 NOTE — PROGRESS NOTES
Return from EGD, instructed pt not to take anything by mouth until evaluated by this nurse for adequate swallowing. Pt verbalizes understanding

## 2025-06-12 NOTE — RT PROTOCOL NOTE
RT Inhaler-Nebulizer Bronchodilator Protocol Note    There is a bronchodilator order in the chart from a provider indicating to follow the RT Bronchodilator Protocol and there is an “Initiate RT Inhaler-Nebulizer Bronchodilator Protocol” order as well (see protocol at bottom of note).    CXR Findings:  No results found.    The findings from the last RT Protocol Assessment were as follows:   History Pulmonary Disease: Chronic pulmonary disease  Respiratory Pattern: Regular pattern and RR 12-20 bpm  Breath Sounds: Slightly diminished and/or crackles  Cough: Strong, spontaneous, non-productive  Indication for Bronchodilator Therapy: On home bronchodilators  Bronchodilator Assessment Score: 4    Aerosolized bronchodilator medication orders have been revised according to the RT Inhaler-Nebulizer Bronchodilator Protocol below.    Respiratory Therapist to perform RT Therapy Protocol Assessment initially then follow the protocol.  Repeat RT Therapy Protocol Assessment PRN for score 0-3 or on second treatment, BID, and PRN for scores above 3.    No Indications - adjust the frequency to every 6 hours PRN wheezing or bronchospasm, if no treatments needed after 48 hours then discontinue using Per Protocol order mode.     If indication present, adjust the RT bronchodilator orders based on the Bronchodilator Assessment Score as indicated below.  Use Inhaler orders unless patient has one or more of the following: on home nebulizer, not able to hold breath for 10 seconds, is not alert and oriented, cannot activate and use MDI correctly, or respiratory rate 25 breaths per minute or more, then use the equivalent nebulizer order(s) with same Frequency and PRN reasons based on the score.  If a patient is on this medication at home then do not decrease Frequency below that used at home.    0-3 - enter or revise RT bronchodilator order(s) to equivalent RT Bronchodilator order with Frequency of every 4 hours PRN for wheezing or increased  work of breathing using Per Protocol order mode.        4-6 - enter or revise RT Bronchodilator order(s) to two equivalent RT bronchodilator orders with one order with BID Frequency and one order with Frequency of every 4 hours PRN wheezing or increased work of breathing using Per Protocol order mode.        7-10 - enter or revise RT Bronchodilator order(s) to two equivalent RT bronchodilator orders with one order with TID Frequency and one order with Frequency of every 4 hours PRN wheezing or increased work of breathing using Per Protocol order mode.       11-13 - enter or revise RT Bronchodilator order(s) to one equivalent RT bronchodilator order with QID Frequency and an Albuterol order with Frequency of every 4 hours PRN wheezing or increased work of breathing using Per Protocol order mode.      Greater than 13 - enter or revise RT Bronchodilator order(s) to one equivalent RT bronchodilator order with every 4 hours Frequency and an Albuterol order with Frequency of every 2 hours PRN wheezing or increased work of breathing using Per Protocol order mode.     RT to enter RT Home Evaluation for COPD & MDI Assessment order using Per Protocol order mode.    Electronically signed by Altagracia Curry RCP on 6/12/2025 at 11:42 AM

## 2025-06-13 ENCOUNTER — APPOINTMENT (OUTPATIENT)
Age: 80
End: 2025-06-13
Payer: MEDICARE

## 2025-06-13 LAB
ALBUMIN SERPL-MCNC: 3.9 G/DL (ref 3.4–5)
ALBUMIN/GLOB SERPL: 2 {RATIO}
ALP SERPL-CCNC: 62 U/L (ref 40–129)
ALT SERPL-CCNC: 23 U/L (ref 10–40)
ANION GAP SERPL CALCULATED.3IONS-SCNC: 12 MMOL/L (ref 3–16)
AST SERPL-CCNC: 16 U/L (ref 15–37)
BASOPHILS # BLD: 0 K/UL (ref 0–0.2)
BASOPHILS NFR BLD: 0 %
BILIRUB SERPL-MCNC: 0.3 MG/DL (ref 0–1)
BUN SERPL-MCNC: 19 MG/DL (ref 7–20)
CALCIUM SERPL-MCNC: 8.9 MG/DL (ref 8.3–10.6)
CHLORIDE SERPL-SCNC: 95 MMOL/L (ref 99–110)
CO2 SERPL-SCNC: 23 MMOL/L (ref 21–32)
CREAT SERPL-MCNC: 0.9 MG/DL (ref 0.6–1.2)
EOSINOPHIL # BLD: 0.03 K/UL (ref 0–0.6)
EOSINOPHILS RELATIVE PERCENT: 0 %
ERYTHROCYTE [DISTWIDTH] IN BLOOD BY AUTOMATED COUNT: 13.2 % (ref 12.4–15.4)
GFR, ESTIMATED: 68 ML/MIN/1.73M2
GLUCOSE SERPL-MCNC: 97 MG/DL (ref 70–99)
HCT VFR BLD AUTO: 37 % (ref 36–48)
HGB BLD-MCNC: 12.8 G/DL (ref 12–16)
IMM GRANULOCYTES # BLD AUTO: 0.08 K/UL (ref 0–0.5)
IMM GRANULOCYTES NFR BLD: 1 %
LYMPHOCYTES NFR BLD: 0.66 K/UL (ref 1–5.1)
LYMPHOCYTES RELATIVE PERCENT: 6 %
MCH RBC QN AUTO: 29.5 PG (ref 26–34)
MCHC RBC AUTO-ENTMCNC: 34.6 G/DL (ref 31–36)
MCV RBC AUTO: 85.3 FL (ref 80–100)
MONOCYTES NFR BLD: 0.64 K/UL (ref 0–1.3)
MONOCYTES NFR BLD: 6 %
NEUTROPHILS NFR BLD: 87 %
NEUTS SEG NFR BLD: 9.33 K/UL (ref 1.7–7.7)
PLATELET # BLD AUTO: 206 K/UL (ref 135–450)
PMV BLD AUTO: 9.4 FL (ref 9.4–12.4)
POTASSIUM SERPL-SCNC: 4.4 MMOL/L (ref 3.5–5.1)
PROT SERPL-MCNC: 5.8 G/DL (ref 6.4–8.2)
RBC # BLD AUTO: 4.34 M/UL (ref 4–5.2)
SODIUM SERPL-SCNC: 130 MMOL/L (ref 136–145)
SURGICAL PATHOLOGY REPORT: NORMAL
WBC OTHER # BLD: 10.7 K/UL (ref 4–11)

## 2025-06-13 PROCEDURE — 2580000003 HC RX 258: Performed by: NURSE PRACTITIONER

## 2025-06-13 PROCEDURE — 51798 US URINE CAPACITY MEASURE: CPT

## 2025-06-13 PROCEDURE — 85025 COMPLETE CBC W/AUTO DIFF WBC: CPT

## 2025-06-13 PROCEDURE — 6360000002 HC RX W HCPCS: Performed by: NURSE PRACTITIONER

## 2025-06-13 PROCEDURE — 6370000000 HC RX 637 (ALT 250 FOR IP): Performed by: INTERNAL MEDICINE

## 2025-06-13 PROCEDURE — 80053 COMPREHEN METABOLIC PANEL: CPT

## 2025-06-13 PROCEDURE — 3430000000 HC RX DIAGNOSTIC RADIOPHARMACEUTICAL: Performed by: INTERNAL MEDICINE

## 2025-06-13 PROCEDURE — 2060000000 HC ICU INTERMEDIATE R&B

## 2025-06-13 PROCEDURE — 94640 AIRWAY INHALATION TREATMENT: CPT

## 2025-06-13 PROCEDURE — 78264 GASTRIC EMPTYING IMG STUDY: CPT

## 2025-06-13 PROCEDURE — A9541 TC99M SULFUR COLLOID: HCPCS | Performed by: INTERNAL MEDICINE

## 2025-06-13 PROCEDURE — 6370000000 HC RX 637 (ALT 250 FOR IP): Performed by: NURSE PRACTITIONER

## 2025-06-13 PROCEDURE — 6370000000 HC RX 637 (ALT 250 FOR IP): Performed by: STUDENT IN AN ORGANIZED HEALTH CARE EDUCATION/TRAINING PROGRAM

## 2025-06-13 PROCEDURE — 36415 COLL VENOUS BLD VENIPUNCTURE: CPT

## 2025-06-13 RX ORDER — LORAZEPAM 1 MG/1
0.5 TABLET ORAL ONCE
Status: COMPLETED | OUTPATIENT
Start: 2025-06-13 | End: 2025-06-13

## 2025-06-13 RX ORDER — LORAZEPAM 1 MG/1
0.5 TABLET ORAL NIGHTLY PRN
Status: DISCONTINUED | OUTPATIENT
Start: 2025-06-13 | End: 2025-06-14 | Stop reason: HOSPADM

## 2025-06-13 RX ORDER — FAMOTIDINE 20 MG/1
20 TABLET, FILM COATED ORAL 2 TIMES DAILY
Status: DISCONTINUED | OUTPATIENT
Start: 2025-06-13 | End: 2025-06-14 | Stop reason: HOSPADM

## 2025-06-13 RX ADMIN — BUDESONIDE 1000 MCG: 0.5 SUSPENSION RESPIRATORY (INHALATION) at 19:30

## 2025-06-13 RX ADMIN — Medication 1 DROP: at 12:53

## 2025-06-13 RX ADMIN — Medication 1 G: at 18:00

## 2025-06-13 RX ADMIN — FAMOTIDINE 20 MG: 20 TABLET, FILM COATED ORAL at 21:35

## 2025-06-13 RX ADMIN — Medication 1 DROP: at 21:35

## 2025-06-13 RX ADMIN — LORAZEPAM 0.5 MG: 1 TABLET ORAL at 23:26

## 2025-06-13 RX ADMIN — HYDROCORTISONE 15 MG: 10 TABLET ORAL at 12:51

## 2025-06-13 RX ADMIN — Medication 1 DROP: at 15:51

## 2025-06-13 RX ADMIN — MEROPENEM 1000 MG: 1 INJECTION INTRAVENOUS at 21:41

## 2025-06-13 RX ADMIN — LOSARTAN POTASSIUM 100 MG: 50 TABLET, FILM COATED ORAL at 12:52

## 2025-06-13 RX ADMIN — FUROSEMIDE 20 MG: 20 TABLET ORAL at 12:51

## 2025-06-13 RX ADMIN — MEROPENEM 1000 MG: 1 INJECTION INTRAVENOUS at 12:55

## 2025-06-13 RX ADMIN — AMLODIPINE BESYLATE 5 MG: 5 TABLET ORAL at 12:50

## 2025-06-13 RX ADMIN — Medication 1 G: at 12:51

## 2025-06-13 RX ADMIN — ROFLUMILAST 500 MCG: 500 TABLET ORAL at 13:08

## 2025-06-13 RX ADMIN — METOPROLOL SUCCINATE 25 MG: 25 TABLET, EXTENDED RELEASE ORAL at 12:51

## 2025-06-13 RX ADMIN — FUROSEMIDE 20 MG: 20 TABLET ORAL at 18:00

## 2025-06-13 RX ADMIN — DULOXETINE 60 MG: 30 CAPSULE, DELAYED RELEASE ORAL at 21:35

## 2025-06-13 RX ADMIN — MEROPENEM 1000 MG: 1 INJECTION INTRAVENOUS at 05:15

## 2025-06-13 RX ADMIN — HYDROCORTISONE 5 MG: 10 TABLET ORAL at 21:35

## 2025-06-13 RX ADMIN — ROSUVASTATIN 20 MG: 20 TABLET, FILM COATED ORAL at 21:35

## 2025-06-13 RX ADMIN — LORAZEPAM 0.5 MG: 1 TABLET ORAL at 21:35

## 2025-06-13 RX ADMIN — IPRATROPIUM BROMIDE 0.5 MG: 0.5 SOLUTION RESPIRATORY (INHALATION) at 19:30

## 2025-06-13 RX ADMIN — FAMOTIDINE 20 MG: 20 TABLET, FILM COATED ORAL at 15:52

## 2025-06-13 RX ADMIN — ARFORMOTEROL TARTRATE 15 MCG: 15 SOLUTION RESPIRATORY (INHALATION) at 19:30

## 2025-06-13 RX ADMIN — Medication 1 MILLICURIE: at 07:35

## 2025-06-13 RX ADMIN — TAMSULOSIN HYDROCHLORIDE 0.4 MG: 0.4 CAPSULE ORAL at 12:52

## 2025-06-13 ASSESSMENT — PAIN - FUNCTIONAL ASSESSMENT: PAIN_FUNCTIONAL_ASSESSMENT: ACTIVITIES ARE NOT PREVENTED

## 2025-06-13 ASSESSMENT — ENCOUNTER SYMPTOMS
ABDOMINAL DISTENTION: 1
PHOTOPHOBIA: 0
NAUSEA: 0
BLOOD IN STOOL: 0
SHORTNESS OF BREATH: 0
EYE DISCHARGE: 0
DIARRHEA: 0
VOMITING: 0
ABDOMINAL PAIN: 0
CHEST TIGHTNESS: 0
CONSTIPATION: 0
COUGH: 0
EYE REDNESS: 0
FACIAL SWELLING: 0

## 2025-06-13 ASSESSMENT — PAIN SCALES - GENERAL
PAINLEVEL_OUTOF10: 6
PAINLEVEL_OUTOF10: 4

## 2025-06-13 ASSESSMENT — PAIN DESCRIPTION - PAIN TYPE: TYPE: ACUTE PAIN

## 2025-06-13 ASSESSMENT — PAIN DESCRIPTION - DESCRIPTORS: DESCRIPTORS: BURNING;ACHING

## 2025-06-13 ASSESSMENT — PAIN DESCRIPTION - LOCATION
LOCATION: ABDOMEN
LOCATION: ABDOMEN

## 2025-06-13 ASSESSMENT — PAIN DESCRIPTION - ORIENTATION
ORIENTATION: MID
ORIENTATION: LOWER

## 2025-06-13 NOTE — PROGRESS NOTES
Patient off floor for gastric emptying study.  She can follow-up as an outpatient with our service.

## 2025-06-13 NOTE — PROGRESS NOTES
Hospitalist Progress Note      Name:  Tiffani Sher /Age/Sex: 1945  (80 y.o. female)   MRN & CSN:  [id number] & [id number] Encounter Date/Time: 2025 11:40 AM EDT    Location:  3215/3215-01 PCP: Not, On File (Inactive)       Hospital Day: 4         Date of Admission: 6/10/2025    Chief Complaint:    Chief Complaint   Patient presents with    Abdominal Pain     Patient complains of mid abdominal pain for several months now. Patient reports increased pain this morning. Patient denies n/v/d.         Hospital Course: Tiffani Sher is a 80 y.o.  female, with past medical history significant for adrenal insufficiency, CKD, COPD, depression, GERD, hyperlipidemia, history of esophageal dilation   Sleep apnea Tracheomalacia, hypertension, who presented to the emergency room with complaint of upper abdominal pain.   The present condition started over the past several months.  Patient states she has had generalized abdominal pain more in the upper epigastric region worse after eating and sometimes improves with rest or heating pad.  She reports decreased appetite and poor oral intake.  States she also feels like she is having trouble swallowing and that liquids come right back up.  She denies any nausea, diarrhea.  States she is also been having difficulty initiating urine flow.  History of bladder cancer and follows with  urology. she has followed [facility] Dr. Fernandes in the past.  She had an EGD with dilation in 2024.  Patient recently had colonoscopy 2025, she reports she has an EGD scheduled this month.  Patient states symptoms have worsened and states she drinks liquids and they come right back up.  She also endorses having a poor appetite  She was admitted for further evaluation and treatment.  During hospitalization patient was evaluated by gastroenterology.    Subjective: Patient seen and examined at bedside after her nuc med test this morning.  Patient is sitting up at side of bed  clips in the ascending aorta and transverse colon.      REPRODUCTIVE ORGANS: No associated masses.      LYMPH NODES: No abnormally enlarged nodes.      PERITONEUM/RETROPERITONEUM: No ascites or free air.      VESSELS: Aorta is normal in caliber.       ABDOMINAL WALL: Normal.      BONES: No significant abnormality. Lumbar spine fusion hardware is present. Right hip arthroplasty is noted. A thoracic spinal stimulator is present.      IMPRESSION:      1. No acute findings in the abdomen or pelvis.      Electronically signed by MD Faustina Cannon, APRN - CNP      NOTE:  This report was transcribed using voice recognition software.  Every effort was made to ensure accuracy; however, inadvertent computerized transcription errors may be present.

## 2025-06-13 NOTE — PROGRESS NOTES
Comprehensive Nutrition Assessment    Type and Reason for Visit:  Consult (diet)    Nutrition Recommendations/Plan:   Diet: Resume diet after procedure.  ONS: Start Ensure daily after procedure.  Monitor: Diet advancement/tolerance, BMs, labs, need NFPE completed.       Malnutrition Assessment:  Malnutrition Status:  Insufficient data (06/13/25 1029)      Nutrition Assessment:    Consult \"diet\". Pt with PMH of COPD, HLD, HTN, tracheomalacia, and bladder cancer who presented with abdominal pain and poor PO intakes. Pt s/p EGD with dilation. Pt currently out of room for gastric emptying study today. Noted pt with little wt loss over the past year ~10#. Noted pt with poor intakes PTA but did eat well yesterday, >75% at most meals. Will start ONS when diet advances and complete NFPE on follow-up.    Nutrition Related Findings:    BM 6/9; Na 130 Wound Type: None       Current Nutrition Intake & Therapies:    Average Meal Intake: %, 26-50% (1 meal)  Average Supplements Intake: None Ordered  Diet NPO    Anthropometric Measures:  Height: 157.5 cm (5' 2\")  Ideal Body Weight (IBW): 110 lbs (50 kg)       Current Body Weight: 78.9 kg (173 lb 15.1 oz), 158.1 % IBW. Weight Source: Standing scale  Current BMI (kg/m2): 31.8                             BMI Categories: Obese Class 1 (BMI 30.0-34.9)    Estimated Daily Nutrient Needs:  Energy Requirements Based On: Kcal/kg  Weight Used for Energy Requirements: Current  Energy (kcal/day): 8018-4132 kcal (15-18 kcal/kg 79 kg CBW)  Weight Used for Protein Requirements: Ideal  Protein (g/day):  gm (1.2-2 gm/kg IBW)  Method Used for Fluid Requirements: 1 ml/kcal  Fluid (ml/day):      Nutrition Diagnosis:   Inadequate oral intake related to swallowing difficulty as evidenced by poor intake prior to admission    Nutrition Interventions:   Food and/or Nutrient Delivery: Continue NPO (Resume diet after procedure)  Nutrition Education/Counseling: Education/Counseling not  indicated  Coordination of Nutrition Care: Continue to monitor while inpatient       Goals:  Goals: PO intake 50% or greater, by next RD assessment  Type of Goal: New goal  Previous Goal Met: New Goal    Nutrition Monitoring and Evaluation:      Food/Nutrient Intake Outcomes: Diet Advancement/Tolerance  Physical Signs/Symptoms Outcomes: Biochemical Data, Weight, Skin, Nutrition Focused Physical Findings    Discharge Planning:    Too soon to determine     Shelia Shea RD, LD  Contact: Brianna Rodriguez Dietitian  Shelia Shea RD, LD: [phone]

## 2025-06-13 NOTE — PROGRESS NOTES
Nutrition Note    Spoke with pt on phone after gastric study regarding diet. Discussed eating small meals, low acid foods, and low fat foods to help with gastritis. Provided charts/lists in D/C instructions for pt at home. Discussed keeping journal to correlate certain foods with gastric issues.    Electronically signed by Shelia Shea RD, LD on 6/13/25 at 2:41 PM EDT    Contact: Brianna Rodriguez Dietitian  Shelia Shea RD, LD: [phone]

## 2025-06-13 NOTE — PROGRESS NOTES
[facility] Note    Patient Active Problem List   Diagnosis    Generalized abdominal pain    Hyponatremia       Past Medical History:   has a past medical history of Adrenal insufficiency, CKD (chronic kidney disease), COPD (chronic obstructive pulmonary disease) ([facility]), Depression, GERD (gastroesophageal reflux disease), Hyperlipidemia, Hypertension, Sleep apnea, and Tracheomalacia.    Past Social History:   reports that she quit smoking about 4 years ago. Her smoking use included cigarettes. She started smoking about 44 years ago. She has a 10 pack-year smoking history. She has never used smokeless tobacco. She reports current alcohol use. She reports that she does not use drugs.    Subjective:    No complaints  No new neurologic symptoms    Review of Systems   Constitutional:  Negative for activity change, appetite change, chills, fatigue, fever and unexpected weight change.   HENT:  Negative for congestion and facial swelling.    Eyes:  Negative for photophobia, discharge and redness.   Respiratory:  Negative for cough, chest tightness and shortness of breath.    Cardiovascular:  Negative for chest pain, palpitations and leg swelling.   Gastrointestinal:  Positive for abdominal distention. Negative for abdominal pain, blood in stool, constipation, diarrhea, nausea and vomiting.   Endocrine: Negative for cold intolerance, heat intolerance and polyuria.   Genitourinary:  Negative for decreased urine volume, difficulty urinating, flank pain and hematuria.   Musculoskeletal:  Negative for joint swelling and neck pain.   Neurological:  Negative for dizziness, seizures, syncope, speech difficulty, light-headedness and headaches.   Hematological:  Does not bruise/bleed easily.   Psychiatric/Behavioral:  Negative for agitation, confusion and hallucinations.        Objective:      /67   Pulse 84   Temp 97.7 °F (36.5 °C) (Oral)   Resp 18   Ht 1.575 m (5' 2\")   Wt 78.9 kg (174 lb)   SpO2 98%

## 2025-06-13 NOTE — DISCHARGE INSTR - DIET
Gastritis Nutrition:    Try limiting the following foods and drinks:   ? Alcohol   ? Coffee   ? Carbonated (bubbly or fizzy) drinks   ? Spicy foods   ? Acidic foods (such as citrus fruits)   ? Fried or high-fat foods (such as hamburgers, french fries, ventura, sausage, and salami)         Low Fat Foods  Food Group Food Choices   Dairy Fat-free (skim), low-fat (1%) milk or buttermilk  Fat-free or low-fat yogurt or cottage cheese; lactose-free yogurt or lactose-free cottage cheese  Fat-free and low-fat cheese  Fat-free, lactose-free milk and lactose-free, low-fat ice cream/frozen yogurt and sour cream may be tolerated better   Fruits and Vegetables Fresh, frozen, canned, or dried fruit  Fresh, frozen, or canned vegetables without added fat or salt   Grains Whole grain breads and cereals, including oats and barley  Pasta, especially whole wheat or other whole grain types  Brown rice  Low-fat crackers and pretzels   Proteins Lean cuts of beef and pork (loin, leg, round, extra-lean hamburger)  Skinless poultry  Fish  Venison and other wild game  Dried beans and peas  Meat alternatives made with soy or textured vegetable protein  Egg whites or egg substitute  Cold cuts made with lean meat or soy protein   Fats and Oils  (Use sparingly) Unsaturated oils (olive, peanut, soy, sunflower, canola)  Soft or liquid margarines and vegetable oil spreads  Salad dressings, seeds and nuts, avocado        High Fat Foods- Limit/avoid  Food Group Food Choices   Dairy Whole milk and reduced-fat (2%) milk  Whole milk yogurt or ice cream  Cream  Half-and-half  Cream cheese  Sour cream  Cheese   Fruits and Vegetables Fried fruits or vegetables  Fruit served with butter or cream  Vegetables prepared with butter, cheese, or cream sauce.   Grains High-fat bakery products, such as doughnuts, biscuits, croissants, [place] pastries, pies, cookies  Snacks made with partially hydrogenated oils, including chips, cheese puffs, snack mixes, regular

## 2025-06-13 NOTE — PLAN OF CARE
Problem: Discharge Planning  Goal: Discharge to home or other facility with appropriate resources  6/12/2025 2127 by Cary Alvarenga RN  Outcome: Progressing  6/12/2025 2126 by Cary Alvarenga RN  Outcome: Progressing  6/12/2025 1827 by Libby Aragon RN  Outcome: Progressing  Flowsheets (Taken 6/12/2025 0838 by Reyna Neal, RN)  Discharge to home or other facility with appropriate resources:   Identify barriers to discharge with patient and caregiver   Arrange for needed discharge resources and transportation as appropriate   Identify discharge learning needs (meds, wound care, etc)     Problem: Safety - Adult  Goal: Free from fall injury  6/12/2025 2127 by Cary Alvarenga RN  Outcome: Progressing  6/12/2025 2126 by Cary Alvarenga RN  Outcome: Progressing  6/12/2025 1827 by Libby Aragon RN  Outcome: Progressing     Problem: Pain  Goal: Verbalizes/displays adequate comfort level or baseline comfort level  6/12/2025 2127 by Cary Alvarenga RN  Outcome: Progressing  6/12/2025 2126 by Cary Alvarenga RN  Outcome: Progressing  6/12/2025 1827 by Libby Aragon RN  Outcome: Progressing  Flowsheets (Taken 6/12/2025 0853 by Reyna Neal, RN)  Verbalizes/displays adequate comfort level or baseline comfort level: Assess pain using appropriate pain scale

## 2025-06-13 NOTE — PLAN OF CARE
Problem: Discharge Planning  Goal: Discharge to home or other facility with appropriate resources  Outcome: Progressing  Flowsheets (Taken 6/13/2025 1250 by Reyna Neal, RN)  Discharge to home or other facility with appropriate resources: Identify discharge learning needs (meds, wound care, etc)     Problem: Safety - Adult  Goal: Free from fall injury  Outcome: Progressing     Problem: Pain  Goal: Verbalizes/displays adequate comfort level or baseline comfort level  Outcome: Progressing  Flowsheets (Taken 6/13/2025 1500 by Reyna Neal, RN)  Verbalizes/displays adequate comfort level or baseline comfort level:   Assess pain using appropriate pain scale   Administer analgesics based on type and severity of pain and evaluate response     Problem: Nutrition Deficit:  Goal: Optimize nutritional status  Outcome: Progressing

## 2025-06-13 NOTE — PROGRESS NOTES
[facility] Note    Patient Active Problem List   Diagnosis    Generalized abdominal pain    Hyponatremia       Past Medical History:   has a past medical history of Adrenal insufficiency, CKD (chronic kidney disease), COPD (chronic obstructive pulmonary disease) ([facility]), Depression, GERD (gastroesophageal reflux disease), Hyperlipidemia, Hypertension, Sleep apnea, and Tracheomalacia.    Past Social History:   reports that she quit smoking about 4 years ago. Her smoking use included cigarettes. She started smoking about 44 years ago. She has a 10 pack-year smoking history. She has never used smokeless tobacco. She reports current alcohol use. She reports that she does not use drugs.    Subjective:    No complaints  No new neurologic symptoms    Review of Systems   Constitutional:  Negative for activity change, appetite change, chills, fatigue, fever and unexpected weight change.   HENT:  Negative for congestion and facial swelling.    Eyes:  Negative for photophobia, discharge and redness.   Respiratory:  Negative for cough, chest tightness and shortness of breath.    Cardiovascular:  Negative for chest pain, palpitations and leg swelling.   Gastrointestinal:  Positive for abdominal distention. Negative for abdominal pain, blood in stool, constipation, diarrhea, nausea and vomiting.   Endocrine: Negative for cold intolerance, heat intolerance and polyuria.   Genitourinary:  Negative for decreased urine volume, difficulty urinating, flank pain and hematuria.   Musculoskeletal:  Negative for joint swelling and neck pain.   Neurological:  Negative for dizziness, seizures, syncope, speech difficulty, light-headedness and headaches.   Hematological:  Does not bruise/bleed easily.   Psychiatric/Behavioral:  Negative for agitation, confusion and hallucinations.        Objective:      BP (!) 172/87   Pulse 79   Temp 98.1 °F (36.7 °C) (Oral)   Resp 16   Ht 1.575 m (5' 2\")   Wt 78.9 kg (174 lb)   SpO2

## 2025-06-13 NOTE — PROGRESS NOTES
RT attempted to give scheduled breathing treatments. Patient is off unit in nuclear medicine at this time.

## 2025-06-14 VITALS
SYSTOLIC BLOOD PRESSURE: 142 MMHG | RESPIRATION RATE: 18 BRPM | WEIGHT: 174 LBS | OXYGEN SATURATION: 98 % | DIASTOLIC BLOOD PRESSURE: 78 MMHG | BODY MASS INDEX: 32.02 KG/M2 | HEART RATE: 99 BPM | TEMPERATURE: 97.7 F | HEIGHT: 62 IN

## 2025-06-14 PROCEDURE — 94640 AIRWAY INHALATION TREATMENT: CPT

## 2025-06-14 PROCEDURE — 2580000003 HC RX 258: Performed by: NURSE PRACTITIONER

## 2025-06-14 PROCEDURE — 6370000000 HC RX 637 (ALT 250 FOR IP): Performed by: NURSE PRACTITIONER

## 2025-06-14 PROCEDURE — 6360000002 HC RX W HCPCS: Performed by: NURSE PRACTITIONER

## 2025-06-14 PROCEDURE — 6370000000 HC RX 637 (ALT 250 FOR IP): Performed by: INTERNAL MEDICINE

## 2025-06-14 PROCEDURE — 2500000003 HC RX 250 WO HCPCS: Performed by: NURSE PRACTITIONER

## 2025-06-14 RX ORDER — FUROSEMIDE 20 MG/1
20 TABLET ORAL 2 TIMES DAILY
Qty: 60 TABLET | Refills: 0 | Status: SHIPPED | OUTPATIENT
Start: 2025-06-14

## 2025-06-14 RX ORDER — TAMSULOSIN HYDROCHLORIDE 0.4 MG/1
0.4 CAPSULE ORAL DAILY
Qty: 30 CAPSULE | Refills: 0 | Status: SHIPPED | OUTPATIENT
Start: 2025-06-14

## 2025-06-14 RX ORDER — FAMOTIDINE 20 MG/1
20 TABLET, FILM COATED ORAL 2 TIMES DAILY
Qty: 60 TABLET | Refills: 0 | Status: SHIPPED | OUTPATIENT
Start: 2025-06-14

## 2025-06-14 RX ORDER — PANTOPRAZOLE SODIUM 40 MG/1
40 TABLET, DELAYED RELEASE ORAL
Qty: 30 TABLET | Refills: 0 | Status: SHIPPED | OUTPATIENT
Start: 2025-06-14

## 2025-06-14 RX ORDER — AMLODIPINE BESYLATE 5 MG/1
5 TABLET ORAL DAILY
Qty: 30 TABLET | Refills: 0 | Status: SHIPPED | OUTPATIENT
Start: 2025-06-14

## 2025-06-14 RX ORDER — SODIUM CHLORIDE 1 G/1
1 TABLET ORAL
Qty: 90 TABLET | Refills: 0 | Status: SHIPPED | OUTPATIENT
Start: 2025-06-14

## 2025-06-14 RX ADMIN — Medication 1 DROP: at 09:30

## 2025-06-14 RX ADMIN — Medication 1 G: at 09:30

## 2025-06-14 RX ADMIN — PANTOPRAZOLE SODIUM 40 MG: 40 TABLET, DELAYED RELEASE ORAL at 09:32

## 2025-06-14 RX ADMIN — LOSARTAN POTASSIUM 100 MG: 50 TABLET, FILM COATED ORAL at 09:31

## 2025-06-14 RX ADMIN — AMLODIPINE BESYLATE 5 MG: 5 TABLET ORAL at 09:31

## 2025-06-14 RX ADMIN — IPRATROPIUM BROMIDE 0.5 MG: 0.5 SOLUTION RESPIRATORY (INHALATION) at 07:25

## 2025-06-14 RX ADMIN — ARFORMOTEROL TARTRATE 15 MCG: 15 SOLUTION RESPIRATORY (INHALATION) at 07:25

## 2025-06-14 RX ADMIN — METOPROLOL SUCCINATE 25 MG: 25 TABLET, EXTENDED RELEASE ORAL at 09:30

## 2025-06-14 RX ADMIN — FUROSEMIDE 20 MG: 20 TABLET ORAL at 09:29

## 2025-06-14 RX ADMIN — MEROPENEM 1000 MG: 1 INJECTION INTRAVENOUS at 04:36

## 2025-06-14 RX ADMIN — ROFLUMILAST 500 MCG: 500 TABLET ORAL at 09:31

## 2025-06-14 RX ADMIN — BUDESONIDE 1000 MCG: 0.5 SUSPENSION RESPIRATORY (INHALATION) at 07:25

## 2025-06-14 RX ADMIN — FAMOTIDINE 20 MG: 20 TABLET, FILM COATED ORAL at 09:30

## 2025-06-14 RX ADMIN — Medication 10 ML: at 09:33

## 2025-06-14 RX ADMIN — HYDROCORTISONE 15 MG: 10 TABLET ORAL at 09:30

## 2025-06-14 RX ADMIN — TAMSULOSIN HYDROCHLORIDE 0.4 MG: 0.4 CAPSULE ORAL at 09:29

## 2025-06-14 NOTE — PLAN OF CARE
Problem: Discharge Planning  Goal: Discharge to home or other facility with appropriate resources  6/14/2025 0853 by Kirti Burnett RN  Outcome: Adequate for Discharge  6/14/2025 0557 by Zuleima Brady RN  Outcome: Progressing  Flowsheets (Taken 6/13/2025 1250 by Reyna Neal, RN)  Discharge to home or other facility with appropriate resources: Identify discharge learning needs (meds, wound care, etc)     Problem: Safety - Adult  Goal: Free from fall injury  6/14/2025 0853 by Kirti Burnett RN  Outcome: Adequate for Discharge  6/14/2025 0557 by Zuleima Brady RN  Outcome: Progressing  Flowsheets (Taken 6/11/2025 2000 by Lillian Ayon RN)  Free From Fall Injury: Instruct family/caregiver on patient safety     Problem: Pain  Goal: Verbalizes/displays adequate comfort level or baseline comfort level  6/14/2025 0853 by Kirti Burnett RN  Outcome: Adequate for Discharge  6/14/2025 0557 by Zuleima Brady RN  Outcome: Progressing  Flowsheets (Taken 6/13/2025 1500 by Reyna Neal RN)  Verbalizes/displays adequate comfort level or baseline comfort level:   Assess pain using appropriate pain scale   Administer analgesics based on type and severity of pain and evaluate response     Problem: Nutrition Deficit:  Goal: Optimize nutritional status  6/14/2025 0853 by Kirti Burnett RN  Outcome: Adequate for Discharge  6/14/2025 0557 by Zuleima Brady RN  Outcome: Progressing  Flowsheets (Taken 6/14/2025 0557)  Nutrient intake appropriate for improving, restoring, or maintaining nutritional needs: Recommend appropriate diets, oral nutritional supplements, and vitamin/mineral supplements

## 2025-06-14 NOTE — PLAN OF CARE
Problem: Discharge Planning  Goal: Discharge to home or other facility with appropriate resources  6/14/2025 0557 by Zuleima Brady RN  Outcome: Progressing  Flowsheets (Taken 6/13/2025 1250 by Reyna Neal RN)  Discharge to home or other facility with appropriate resources: Identify discharge learning needs (meds, wound care, etc)  6/13/2025 1709 by Libby Aragon RN  Outcome: Progressing  Flowsheets (Taken 6/13/2025 1250 by Reyna Neal, RN)  Discharge to home or other facility with appropriate resources: Identify discharge learning needs (meds, wound care, etc)     Problem: Safety - Adult  Goal: Free from fall injury  6/14/2025 0557 by Zuleima Brady RN  Outcome: Progressing  Flowsheets (Taken 6/11/2025 2000 by Lillian Ayon RN)  Free From Fall Injury: Instruct family/caregiver on patient safety  6/13/2025 1709 by Libby Aragon RN  Outcome: Progressing     Problem: Pain  Goal: Verbalizes/displays adequate comfort level or baseline comfort level  6/14/2025 0557 by Zuleima Brady RN  Outcome: Progressing  Flowsheets (Taken 6/13/2025 1500 by Reyna Neal RN)  Verbalizes/displays adequate comfort level or baseline comfort level:   Assess pain using appropriate pain scale   Administer analgesics based on type and severity of pain and evaluate response  6/13/2025 1709 by Libby Aragon RN  Outcome: Progressing  Flowsheets (Taken 6/13/2025 1500 by Reyna Neal, RN)  Verbalizes/displays adequate comfort level or baseline comfort level:   Assess pain using appropriate pain scale   Administer analgesics based on type and severity of pain and evaluate response     Problem: Nutrition Deficit:  Goal: Optimize nutritional status  6/14/2025 0557 by Zuleima Brady RN  Outcome: Progressing  Flowsheets (Taken 6/14/2025 0557)  Nutrient intake appropriate for improving, restoring, or maintaining nutritional needs: Recommend appropriate diets, oral nutritional supplements, and vitamin/mineral supplements  6/13/2025 1709 by

## 2025-06-14 NOTE — PROGRESS NOTES
Patient discharged to home. IV catheter removed dressing applied. Discharge instructions and education provided to patient, all questions answered. Patient transported off unit to lobby and vehicle.

## 2025-06-14 NOTE — DISCHARGE SUMMARY
Hospital Medicine Discharge Summary    Patient ID: Tiffani Sher      Patient's PCP: Not, On File (Inactive)    Admit Date: 6/10/2025     Discharge Date: 6/14/2025      Admitting Physician: Jag Meza MD     Discharge Physician: ANGEL Schofield - CNP     Discharge Diagnoses  Hyponatremia  Gastritis  History of bladder cancer  See other comorbidities listed below      Hospital Course: Tiffani Sher is a 80 y.o.  female, with past medical history significant for adrenal insufficiency, CKD, COPD, depression, GERD, hyperlipidemia, history of esophageal dilation 2024  Sleep apnea Tracheomalacia, hypertension, who presented to the emergency room with complaint of upper abdominal pain.  Also found to have hyponatremia.  GI was consulted.  See details below.    Hyponatremia  - Sodium 120 on admission, on discharge 130.  Nephrology followed along.  Recommend sodium chloride tabs 1 g 3 times daily, Lasix 20 mg p.o. twice daily, 1.5 L fluid restriction.  She can follow-up with nephrology outpatient.    Gastritis  -GI consulted, and performed EGD.  Found to have gastritis.  Did perform a nuclear medicine gastric emptying scan, which was normal.  -Continue PPI and Pepcid  -Follow-up with GI outpatient      History of bladder cancer-follow-up with  urologist    Bacteriuria-colonization of ESBL E. coli.  Patient asymptomatic.  [place] count 10-50 K.  Did receive 48 hours of IV meropenem.  No antibiotics at discharge    Hypertension-continue PTA medications.  Nephrology added amlodipine.  Depression-continue PTA medications  Adrenal insufficiency-continue PTA medications  COPD not in exacerbation-continue MDI regimen  Sleep apnea-continue home CPAP      Patient demanded to go home immediately this morning.  She told the nurse that she was unhappy with her plan of care.  Patient is medically stable for discharge therefore orders were placed.  She had no questions or concerns expressed when I saw her.

## 2025-06-14 NOTE — DISCHARGE INSTRUCTIONS
Follow up with pcp in 1 week    Follow up with nephrology in 1-2 weeks    Follow up with GI in 1-2 weeks    Follow up with your urologist at  in 1 week

## 2025-06-14 NOTE — CARE COORDINATION
Spoke with patient at bedside, offered HHC for d/c, patient declined. 2nd IMM delivered.    Follow-Up copy of Important Message from Medicare (IMM2) has been explained to patient and/or designated healthcare decision maker (HCDM). Pt and/or HCDM aware that patient is permitted to stay an additional 4 hours prior to discharge to consider an appeal if they feel as though they are being discharged too soon. Patient may discharge as planned if chooses to do so.  Patient/HCDM voice no other concerns or questions regarding this process.       Deann Santacruz RN

## (undated) DEVICE — BW-412T DISP COMBO CLEANING BRUSH: Brand: SINGLE USE COMBINATION CLEANING BRUSH

## (undated) DEVICE — AIR/WATER CLEANING ADAPTER FOR OLYMPUS® GI ENDOSCOPE: Brand: BULLDOG®

## (undated) DEVICE — TUBING, SUCTION, 1/4" X 12', STRAIGHT: Brand: MEDLINE

## (undated) DEVICE — FORCEPS BX L240CM JAW DIA2.4MM ORNG L CAP W/ NDL DISP RAD

## (undated) DEVICE — SOLUTION IRRIG 1000ML STRL H2O USP PLAS POUR BTL

## (undated) DEVICE — ENDOSCOPIC KIT 1.1+ 6 FT 2 GWN AAMI LEVEL 3

## (undated) DEVICE — CONMED SCOPE SAVER BITE BLOCK, 20X27 MM: Brand: SCOPE SAVER

## (undated) DEVICE — SYRINGE BLB 60 CC TIP PROTECTOR STRL LF

## (undated) DEVICE — FORCEPS BX L240CM WRK CHN 2.8MM STD CAP W/ NDL MIC MESH

## (undated) DEVICE — BRUSH CLN L220CM DIA5MM ZAH DISP FOR WRK CHAN DIA2.8/4.2MM

## (undated) DEVICE — FORCEPS BX L240CM JAW DIA22MM ORNG STD CAP W NDL RAD JAW 4

## (undated) DEVICE — SYRINGE, LUER SLIP, STERILE, 60ML: Brand: MEDLINE

## (undated) DEVICE — SINGLE USE AIR/WATER, SUCTION AND BIOPSY VALVES SET: Brand: ORCAPOD™

## (undated) DEVICE — TRAP SPEC POLYP REM STRNR CLN DSGN MAGNIFYING WIND DISP

## (undated) DEVICE — SNARE COLD DIAMOND 10MM THIN

## (undated) DEVICE — TRAP POLYP ETRAP

## (undated) DEVICE — CANNULA SAMP CO2 AD GRN 7FT CO2 AND 7FT O2 TBNG UNIV CONN

## (undated) DEVICE — SNARES COLD OVAL 10MM THIN

## (undated) DEVICE — CLIP LIG L235CM RESOL 360 BX/20